# Patient Record
Sex: FEMALE | Race: BLACK OR AFRICAN AMERICAN | Employment: UNEMPLOYED | URBAN - METROPOLITAN AREA
[De-identification: names, ages, dates, MRNs, and addresses within clinical notes are randomized per-mention and may not be internally consistent; named-entity substitution may affect disease eponyms.]

---

## 2018-10-26 ENCOUNTER — OP HISTORICAL/CONVERTED ENCOUNTER (OUTPATIENT)
Dept: OTHER | Age: 28
End: 2018-10-26

## 2019-07-02 ENCOUNTER — ED HISTORICAL/CONVERTED ENCOUNTER (OUTPATIENT)
Dept: OTHER | Age: 29
End: 2019-07-02

## 2020-07-18 ENCOUNTER — ED HISTORICAL/CONVERTED ENCOUNTER (OUTPATIENT)
Dept: OTHER | Age: 30
End: 2020-07-18

## 2020-09-26 ENCOUNTER — HOSPITAL ENCOUNTER (EMERGENCY)
Age: 30
Discharge: HOME | End: 2020-09-26
Attending: INTERNAL MEDICINE
Payer: MEDICAID

## 2020-09-26 ENCOUNTER — APPOINTMENT (OUTPATIENT)
Dept: GENERAL RADIOLOGY | Age: 30
End: 2020-09-26
Attending: INTERNAL MEDICINE
Payer: MEDICAID

## 2020-09-26 VITALS
WEIGHT: 145 LBS | BODY MASS INDEX: 26.68 KG/M2 | DIASTOLIC BLOOD PRESSURE: 73 MMHG | TEMPERATURE: 98.1 F | RESPIRATION RATE: 16 BRPM | OXYGEN SATURATION: 100 % | HEART RATE: 81 BPM | HEIGHT: 62 IN | SYSTOLIC BLOOD PRESSURE: 112 MMHG

## 2020-09-26 DIAGNOSIS — R07.9 CHEST PAIN, UNSPECIFIED TYPE: Primary | ICD-10-CM

## 2020-09-26 LAB
ALBUMIN SERPL-MCNC: 3.8 G/DL (ref 3.5–5)
ALBUMIN/GLOB SERPL: 1 {RATIO} (ref 1.1–2.2)
ALP SERPL-CCNC: 64 U/L (ref 45–117)
ALT SERPL-CCNC: 19 U/L (ref 12–78)
ANION GAP SERPL CALC-SCNC: 8 MMOL/L (ref 5–15)
APPEARANCE UR: CLEAR
AST SERPL W P-5'-P-CCNC: 11 U/L (ref 15–37)
BACTERIA URNS QL MICRO: ABNORMAL /HPF
BILIRUB SERPL-MCNC: 0.2 MG/DL (ref 0.2–1)
BILIRUB UR QL: ABNORMAL
BUN SERPL-MCNC: 12 MG/DL (ref 6–20)
BUN/CREAT SERPL: 15 (ref 12–20)
CA-I BLD-MCNC: 9.2 MG/DL (ref 8.5–10.1)
CHLORIDE SERPL-SCNC: 105 MMOL/L (ref 97–108)
CO2 SERPL-SCNC: 28 MMOL/L (ref 21–32)
COLOR UR: YELLOW
CREAT SERPL-MCNC: 0.79 MG/DL (ref 0.55–1.02)
ERYTHROCYTE [DISTWIDTH] IN BLOOD BY AUTOMATED COUNT: 13 % (ref 11.5–14.5)
GLOBULIN SER CALC-MCNC: 3.7 G/DL (ref 2–4)
GLUCOSE SERPL-MCNC: 115 MG/DL (ref 65–100)
GLUCOSE UR STRIP.AUTO-MCNC: NEGATIVE MG/DL
HCG UR QL: NEGATIVE
HCT VFR BLD AUTO: 37.4 % (ref 35–47)
HGB BLD-MCNC: 11.7 % (ref 11.5–16)
HGB UR QL STRIP: ABNORMAL
KETONES UR QL STRIP.AUTO: 15 MG/DL
LEUKOCYTE ESTERASE UR QL STRIP.AUTO: NEGATIVE
MCH RBC QN AUTO: 26.8 PG (ref 26–34)
MCHC RBC AUTO-ENTMCNC: 31.3 G/DL (ref 30–36.5)
MCV RBC AUTO: 85.6 FL (ref 80–99)
NITRITE UR QL STRIP.AUTO: NEGATIVE
PH UR STRIP: 5 [PH] (ref 5–8)
PLATELET # BLD AUTO: 261 K/UL (ref 150–400)
PMV BLD AUTO: 10.1 FL (ref 8.9–12.9)
POTASSIUM SERPL-SCNC: 3.8 MMOL/L (ref 3.5–5.1)
PROT SERPL-MCNC: 7.5 G/DL (ref 6.4–8.2)
PROT UR STRIP-MCNC: NEGATIVE MG/DL
RBC # BLD AUTO: 4.37 M/UL (ref 3.8–5.2)
RBC #/AREA URNS HPF: ABNORMAL /HPF (ref 0–5)
SODIUM SERPL-SCNC: 141 MMOL/L (ref 136–145)
SP GR UR REFRACTOMETRY: 1.02 (ref 1–1.03)
TROPONIN I SERPL-MCNC: <0.05 NG/ML
UA: UC IF INDICATED,UAUC: ABNORMAL
UROBILINOGEN UR QL STRIP.AUTO: 1 EU/DL (ref 0.2–1)
WBC # BLD AUTO: 8.7 K/UL (ref 3.6–11)
WBC URNS QL MICRO: ABNORMAL /HPF (ref 0–4)

## 2020-09-26 PROCEDURE — 71046 X-RAY EXAM CHEST 2 VIEWS: CPT

## 2020-09-26 PROCEDURE — 84484 ASSAY OF TROPONIN QUANT: CPT

## 2020-09-26 PROCEDURE — 99283 EMERGENCY DEPT VISIT LOW MDM: CPT

## 2020-09-26 PROCEDURE — 80053 COMPREHEN METABOLIC PANEL: CPT

## 2020-09-26 PROCEDURE — 81001 URINALYSIS AUTO W/SCOPE: CPT

## 2020-09-26 PROCEDURE — 85027 COMPLETE CBC AUTOMATED: CPT

## 2020-09-26 PROCEDURE — 93005 ELECTROCARDIOGRAM TRACING: CPT

## 2020-09-26 PROCEDURE — 81025 URINE PREGNANCY TEST: CPT

## 2020-09-26 NOTE — ED PROVIDER NOTES
EMERGENCY DEPARTMENT HISTORY AND PHYSICAL EXAM      Date: 9/26/2020  Patient Name: James Cleaning    History of Presenting Illness     Chief Complaint   Patient presents with    Chest Pain     all week with SOB    Leg Pain     R leg       History Provided By: Patient    HPI: James Cleaning, 26 y.o. female with a past medical history significant History reviewed. No pertinent past medical history. and presents to the ED with cc of squeezing chest pain x 2 days on and off, pain in leg. NO f/c/n/v/d/c    There are no other complaints, changes, or physical findings at this time. PCP: Daylin Blair MD    No current facility-administered medications on file prior to encounter. No current outpatient medications on file prior to encounter. Past History     Past Medical History:  History reviewed. No pertinent past medical history. Past Surgical History:  History reviewed. No pertinent surgical history. Family History:  History reviewed. No pertinent family history. Social History:  Social History     Tobacco Use    Smoking status: Never Smoker    Smokeless tobacco: Never Used   Substance Use Topics    Alcohol use: Yes     Alcohol/week: 4.0 standard drinks     Types: 4 Glasses of wine per week    Drug use: Never       Allergies:  No Known Allergies      Review of Systems   Review of Systems   Constitutional: Negative. HENT: Negative. Respiratory: Negative. Cardiovascular: Negative. Gastrointestinal: Negative. Genitourinary: Negative. Neurological: Negative. Physical Exam   Physical Exam  Vitals signs and nursing note reviewed. Constitutional:       Appearance: She is well-developed. HENT:      Head: Normocephalic and atraumatic. Right Ear: External ear normal.      Left Ear: External ear normal.      Mouth/Throat:      Pharynx: No oropharyngeal exudate. Eyes:      General: No scleral icterus. Right eye: No discharge. Left eye: No discharge. Conjunctiva/sclera: Conjunctivae normal.      Pupils: Pupils are equal, round, and reactive to light. Neck:      Musculoskeletal: Normal range of motion. Thyroid: No thyromegaly. Trachea: No tracheal deviation. Cardiovascular:      Rate and Rhythm: Normal rate and regular rhythm. Heart sounds: Normal heart sounds. No murmur. Pulmonary:      Effort: Pulmonary effort is normal. No respiratory distress. Breath sounds: Normal breath sounds. No wheezing or rales. Chest:      Chest wall: No tenderness. Abdominal:      General: Bowel sounds are normal. There is no distension. Palpations: Abdomen is soft. Tenderness: There is no abdominal tenderness. There is no guarding or rebound. Musculoskeletal: Normal range of motion. General: No tenderness. Lymphadenopathy:      Cervical: No cervical adenopathy. Skin:     General: Skin is warm. Findings: No erythema. Neurological:      Mental Status: She is alert and oriented to person, place, and time. Cranial Nerves: No cranial nerve deficit. Coordination: Coordination normal.   Psychiatric:         Behavior: Behavior normal.         Thought Content:  Thought content normal.         Judgment: Judgment normal.         Diagnostic Study Results     Labs -     Recent Results (from the past 12 hour(s))   URINALYSIS W/ REFLEX CULTURE    Collection Time: 09/26/20  6:00 AM    Specimen: Urine   Result Value Ref Range    Color Yellow      Appearance Clear Clear      Specific gravity 1.025 1.003 - 1.030      pH (UA) 5.0 5.0 - 8.0      Protein Negative Negative mg/dL    Glucose Negative Negative mg/dL    Ketone 15 (A) Negative mg/dL    Bilirubin Small (A) Negative      Blood Trace (A) Negative      Urobilinogen 1.0 0.2 - 1.0 EU/dL    Nitrites Negative Negative      Leukocyte Esterase Negative Negative      WBC 0-4 0 - 4 /hpf    RBC 0-5 0 - 5 /hpf    Bacteria 1+ (A) Negative /hpf    UA:UC IF INDICATED Culture not indicated by UA result Culture not indicated by UA result     HCG URINE, QL    Collection Time: 09/26/20  6:00 AM   Result Value Ref Range    HCG urine, QL Negative Negative     CBC W/O DIFF    Collection Time: 09/26/20  6:30 AM   Result Value Ref Range    WBC 8.7 3.6 - 11.0 K/uL    RBC 4.37 3.80 - 5.20 M/uL    HGB 11.7 11.5 - 16.0 %    HCT 37.4 35.0 - 47.0 %    MCV 85.6 80.0 - 99.0 FL    MCH 26.8 26.0 - 34.0 PG    MCHC 31.3 30.0 - 36.5 g/dL    RDW 13.0 11.5 - 14.5 %    PLATELET 362 754 - 853 K/uL    MPV 10.1 8.9 - 12.9 FL       Radiologic Studies -   XR CHEST PA LAT   Final Result   IMPRESSION: No acute cardiopulmonary abnormality. . Scoliosis           CT Results  (Last 48 hours)    None        CXR Results  (Last 48 hours)               09/26/20 0613  XR CHEST PA LAT Final result    Impression:  IMPRESSION: No acute cardiopulmonary abnormality. . Scoliosis           Narrative:  HISTORY: cp for one week with sob        TECHNIQUE: PA and lateral chest radiographs   COMPARISON: 7/18/2020   LIMITATIONS[de-identified] None       TUBES/LINES: None       LUNG PARENCHYMA: Normal   TRACHEA/BRONCHI: Normal   PULMONARY VESSELS: Normal   AORTIC SHADOW: Unremarkable   PLEURA: Normal   HEART: Normal   MEDIASTINUM: Normal   BONE/SOFT TISSUES: No acute abnormality. Stable appearance of the patient's   known thoracic rotoscoliosis       OTHER: None                 EKG: normal EKG, normal sinus rhythm, unchanged from previous tracings. Medical Decision Making   I am the first provider for this patient. I reviewed the vital signs, available nursing notes, past medical history, past surgical history, family history and social history. Vital Signs-Reviewed the patient's vital signs.   Patient Vitals for the past 12 hrs:   Temp Pulse Resp BP SpO2   09/26/20 0600     100 %   09/26/20 0546 98.1 °F (36.7 °C) 81 16 112/73 100 %       Records Reviewed: Nursing Notes    Provider Notes (Medical Decision Making):   MDM  Number of Diagnoses or Management Options  Chest pain, unspecified type: established, improving     Amount and/or Complexity of Data Reviewed  Clinical lab tests: ordered and reviewed  Tests in the radiology section of CPT®: ordered and reviewed  Discussion of test results with the performing providers: yes    Risk of Complications, Morbidity, and/or Mortality  Presenting problems: moderate  Diagnostic procedures: low  Management options: low    Patient Progress  Patient progress: stable        ED Course:   Initial assessment performed. The patients presenting problems have been discussed, and they are in agreement with the care plan formulated and outlined with them. I have encouraged them to ask questions as they arise throughout their visit. PROCEDURES  Procedures         PLAN:  1. There are no discharge medications for this patient. 2.   Follow-up Information     Follow up With Specialties Details Why Contact Info    Froy Brown MD Internal Medicine In 1 day  [facility]      Tracy Cabezas MD Cardiology, [facility] Vascular Surgery   [address] [place] [address]  [phone]          Return to ED if worse     Diagnosis     Clinical Impression:   1.  Chest pain, unspecified type

## 2020-09-27 LAB
ATRIAL RATE: 82 BPM
CALCULATED P AXIS, ECG09: 39 DEGREES
CALCULATED R AXIS, ECG10: 19 DEGREES
CALCULATED T AXIS, ECG11: 44 DEGREES
DIAGNOSIS, 93000: NORMAL
P-R INTERVAL, ECG05: 120 MS
Q-T INTERVAL, ECG07: 380 MS
QRS DURATION, ECG06: 74 MS
QTC CALCULATION (BEZET), ECG08: 443 MS
VENTRICULAR RATE, ECG03: 82 BPM

## 2020-09-29 ENCOUNTER — OFFICE VISIT (OUTPATIENT)
Dept: INTERNAL MEDICINE CLINIC | Age: 30
End: 2020-09-29
Payer: MEDICAID

## 2020-09-29 VITALS
HEIGHT: 59 IN | RESPIRATION RATE: 16 BRPM | TEMPERATURE: 98.2 F | DIASTOLIC BLOOD PRESSURE: 80 MMHG | BODY MASS INDEX: 28.91 KG/M2 | WEIGHT: 143.4 LBS | OXYGEN SATURATION: 98 % | HEART RATE: 72 BPM | SYSTOLIC BLOOD PRESSURE: 110 MMHG

## 2020-09-29 DIAGNOSIS — E55.9 VITAMIN D DEFICIENCY: ICD-10-CM

## 2020-09-29 DIAGNOSIS — Z91.89 EXCESSIVE CONSUMPTION OF COFFEE: ICD-10-CM

## 2020-09-29 DIAGNOSIS — M79.671 RIGHT FOOT PAIN: ICD-10-CM

## 2020-09-29 DIAGNOSIS — R12 HEARTBURN: ICD-10-CM

## 2020-09-29 DIAGNOSIS — R07.89 ATYPICAL CHEST PAIN: Primary | ICD-10-CM

## 2020-09-29 DIAGNOSIS — M21.42 FLAT FEET, BILATERAL: ICD-10-CM

## 2020-09-29 DIAGNOSIS — M21.41 FLAT FEET, BILATERAL: ICD-10-CM

## 2020-09-29 DIAGNOSIS — M94.0 COSTOCHONDRITIS: ICD-10-CM

## 2020-09-29 DIAGNOSIS — R06.2 WHEEZING: ICD-10-CM

## 2020-09-29 PROCEDURE — 99203 OFFICE O/P NEW LOW 30 MIN: CPT | Performed by: INTERNAL MEDICINE

## 2020-09-29 RX ORDER — ALBUTEROL SULFATE 90 UG/1
1 AEROSOL, METERED RESPIRATORY (INHALATION)
Qty: 1 INHALER | Refills: 0 | Status: SHIPPED | OUTPATIENT
Start: 2020-09-29

## 2020-09-29 RX ORDER — OMEPRAZOLE 40 MG/1
40 CAPSULE, DELAYED RELEASE ORAL DAILY
Qty: 30 CAP | Refills: 1 | Status: SHIPPED | OUTPATIENT
Start: 2020-09-29 | End: 2020-11-30 | Stop reason: SDUPTHER

## 2020-09-29 NOTE — PROGRESS NOTES
Shruthi Linares is a 31 y.o. female and presents with Establish Care (F/U [facility] ER 9/26/2020 CHEST and  SOB , RT leg burning pain but not now, given ibuprofen for discomfort)      She brought emergency room discharge paper she had visited in July, having, right leg pain and burning pain, in the right foot but not now, she visited few days ago to, emergency room at [facility], for the chest pain,  she is referred to, cardiologist and PCP she does not have any primary care physician so far she had cardiogram and lab work done in the hospital few days ago and it is normal,,  while taking history she acknowledged that she is drinking lots of coffee, 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 Currently she has no chest pain she has made appointment with cardiologist, for follow-up after recent ER visit. On 26 September. Currently she has no leg pain. EKG was showing  No exposure to secondhand smoke. Normal sinus rhythm with sinus arrhythmia and possible left atrial enlargement no previous EKG was available to compare, borderline EKG, she was going to study for business administration online,    Review of Systems    Review of Systems   Constitutional: Negative. Eyes: Negative for blurred vision. Cardiovascular:         recently visited ER for chest pain was told that she has costochondritis,,        No past medical history on file. No past surgical history on file.   Social History     Socioeconomic History    Marital status: SINGLE     Spouse name: Not on file    Number of children: Not on file    Years of education: Not on file    Highest education level: Not on file   Tobacco Use    Smoking status: Never Smoker    Smokeless tobacco: Never Used   Substance and Sexual Activity    Alcohol use: Yes     Alcohol/week: 4.0 standard drinks     Types: 4 Glasses of wine per week    Drug use: Never     Family History   Problem Relation Age of Onset    Headache Maternal Grandmother     Diabetes Maternal Grandmother     Heart Disease Maternal Grandfather     Diabetes Paternal Grandmother     Headache Paternal Grandmother        No Known Allergies    Objective:  Visit Vitals  /80 (BP 1 Location: Right arm, BP Patient Position: Sitting)   Pulse 72   Temp 98.2 °F (36.8 °C) (Oral)   Resp 16   Ht 4' 11\" (1.499 m)   Wt 143 lb 6.4 oz (65 kg)   LMP 09/21/2020   SpO2 98%   BMI 28.96 kg/m²       Physical Exam:   Constitutional: General Appearance:Overweight Level of Distress: NAD. Ambulation: ambulating normal  Psychiatric: Mental Status: normal mood and affect and active and alert. Orientation: to time, place, and person. no agitation. ,normal eye contact. Head: Head: normocephalic and atraumatic. Eyes: Pupils: PERRLA. Sclerae: non-icteric. Neck: Neck: supple, trachea midline, and no masses. Lymph Nodes: no cervical LAD. Thyroid: no enlargement or nodules and non-tender. Lungs: Respiratory effort: no dyspnea. Auscultation: no wheezing, rales/crackles, or rhonchi and breath sounds normal and good air movement. Cardiovascular: Apical Impulse: not displaced. Heart Auscultation: normal S1 and S2; no murmurs, rubs, or gallops; and RRR. Neck vessels: no carotid bruits. Pulses including femoral / pedal: normal throughout. Abdomen: Bowel Sounds: normal. Inspection and Palpation: no tenderness, guarding, or masses and soft and non-distended. Liver: non-tender and no hepatomegaly. Spleen: non-tender and no splenomegaly. Musculoskeletal[de-identified] Extremities: no edema,no varicosities.  No Calf tenderness. Neurologic: Gait and Station: normal gait and station. Motor Strength normal right and left. Skin: Inspection and palpation: no rash, lesions, or ulcer. Results for orders placed or performed during the hospital encounter of 09/26/20   URINALYSIS W/ REFLEX CULTURE    Specimen: Urine   Result Value Ref Range    Color Yellow      Appearance Clear Clear      Specific gravity 1.025 1.003 - 1.030      pH (UA) 5.0 5.0 - 8.0      Protein Negative Negative mg/dL    Glucose Negative Negative mg/dL    Ketone 15 (A) Negative mg/dL    Bilirubin Small (A) Negative      Blood Trace (A) Negative      Urobilinogen 1.0 0.2 - 1.0 EU/dL    Nitrites Negative Negative      Leukocyte Esterase Negative Negative      WBC 0-4 0 - 4 /hpf    RBC 0-5 0 - 5 /hpf    Bacteria 1+ (A) Negative /hpf    UA:UC IF INDICATED Culture not indicated by UA result Culture not indicated by UA result     HCG URINE, QL   Result Value Ref Range    HCG urine, QL Negative Negative     CBC W/O DIFF   Result Value Ref Range    WBC 8.7 3.6 - 11.0 K/uL    RBC 4.37 3.80 - 5.20 M/uL    HGB 11.7 11.5 - 16.0 %    HCT 37.4 35.0 - 47.0 %    MCV 85.6 80.0 - 99.0 FL    MCH 26.8 26.0 - 34.0 PG    MCHC 31.3 30.0 - 36.5 g/dL    RDW 13.0 11.5 - 14.5 %    PLATELET 640 251 - 865 K/uL    MPV 10.1 8.9 - 76.3 FL   METABOLIC PANEL, COMPREHENSIVE   Result Value Ref Range    Sodium 141 136 - 145 mmol/L    Potassium 3.8 3.5 - 5.1 mmol/L    Chloride 105 97 - 108 mmol/L    CO2 28 21 - 32 mmol/L    Anion gap 8 5 - 15 mmol/L    Glucose 115 (H) 65 - 100 mg/dL    BUN 12 6 - 20 mg/dL    Creatinine 0.79 0.55 - 1.02 mg/dL    BUN/Creatinine ratio 15 12 - 20      GFR est AA >60 >60 ml/min/1.73m2    GFR est non-AA >60 >60 ml/min/1.73m2    Calcium 9.2 8.5 - 10.1 mg/dL    Bilirubin, total 0.2 0.2 - 1.0 mg/dL    AST (SGOT) 11 (L) 15 - 37 U/L    ALT (SGPT) 19 12 - 78 U/L    Alk.  phosphatase 64 45 - 117 U/L    Protein, total 7.5 6.4 - 8.2 g/dL    Albumin 3.8 3.5 - 5.0 g/dL    Globulin 3.7 2.0 - 4.0 g/dL    A-G Ratio 1.0 (L) 1.1 - 2.2     TROPONIN I   Result Value Ref Range    Troponin-I, Qt. <0.05 <0.05 ng/mL   EKG, 12 LEAD, INITIAL   Result Value Ref Range    Ventricular Rate 82 BPM    Atrial Rate 82 BPM    P-R Interval 120 ms    QRS Duration 74 ms    Q-T Interval 380 ms    QTC Calculation (Bezet) 443 ms    Calculated P Axis 39 degrees    Calculated R Axis 19 degrees    Calculated T Axis 44 degrees    Diagnosis       Normal sinus rhythm with sinus arrhythmia  Possible Left atrial enlargement  Borderline ECG  No previous ECGs available  Confirmed by Crystal Khan ([id number]) on 9/27/2020 6:37:16 AM         Assessment/Plan:    ICD-10-CM ICD-9-CM    1. Atypical chest pain  R07.89 786.59 TSH 3RD GENERATION   2. Costochondritis  M94.0 733.6    3. Heartburn  R12 787.1    4. Flat feet, bilateral  M21.41 734     M21.42     5. Right foot pain  M79.671 729.5    6. Vitamin D deficiency  E55.9 268.9 VITAMIN D, 25 HYDROXY   7. Wheezing  R06.2 786.07    8. Excessive consumption of coffee  Z91.89 V49.89      Orders Placed This Encounter    VITAMIN D, 25 HYDROXY    TSH 3RD GENERATION    omeprazole (PRILOSEC) 40 mg capsule     Sig: Take 1 Cap by mouth daily. Take 30 minutes before eating once a day. Indications: heartburn     Dispense:  30 Cap     Refill:  1    albuterol (PROVENTIL HFA, VENTOLIN HFA, PROAIR HFA) 90 mcg/actuation inhaler     Sig: Take 1 Puff by inhalation every six (6) hours as needed for Wheezing or Shortness of Breath.  Indications: asthma attack     Dispense:  1 Inhaler     Refill:  0       Chest pain visited emergency room on 26 September she was told she has costochondritis and she was given educational handouts, EKG was taken showing sinus rhythm with sinus arrhythmia, and possible left atrial enlargement but no previous EKG available to compare and she was referred to cardiologist for follow-up she was told to take OTC ibuprofen, today she has no chest pain or shortness of breath and on examination no wheezing, different etiologies explained 1 of the etiology may be due to, GERD, or might have allergic reactive airway disease she has family history of heart failure with her grandparents but not with parents, recommended to quit drinking coffee gradually she is drinking in excess, she might have gastritis,, however she will need follow-up with cardiologist to rule out any cardiac etiology, her blood pressure is controlled, she is non-smoker and nonalcoholic no history of substance abuse started on, omeprazole,, and also albuterol inhaler to try she has history of allergies,. Follow-up in 4 weeks. , no history of heavy lifting fall or trauma. She is not taking vitamins and history of vitamin D deficiency vitamin D level ordered. On examination she has flatfeet discussed about putting inserts and stretching exercise to prevent excessive strain on the gastrocnemius muscle,. Explained about healthy eating habits and 1400-calorie diet plan and walking 30 minutes 5 days a week, and small frequent meal, lifestyle and l modification in eating habits to avoid large portion at one time, instead small frequent meals and not to, sleep immediately after eating,. Labs ordered,. Refills given side effects discussed follow-up in 8 weeks. Answered all her questions. ,    lose weight, increase physical activity, follow low fat diet, continue present plan, routine labs ordered, call if any problems    There are no Patient Instructions on file for this visit. Follow-up and Dispositions    · Return in about 2 months (around 11/29/2020) for gerd . pain . nonfasting labs now.

## 2020-10-02 ENCOUNTER — TELEPHONE (OUTPATIENT)
Dept: INTERNAL MEDICINE CLINIC | Age: 30
End: 2020-10-02

## 2020-10-02 LAB
25(OH)D3+25(OH)D2 SERPL-MCNC: 9.7 NG/ML (ref 30–100)
TSH SERPL DL<=0.005 MIU/L-ACNC: 1.6 UIU/ML (ref 0.45–4.5)

## 2020-10-02 RX ORDER — ERGOCALCIFEROL 1.25 MG/1
50000 CAPSULE ORAL
Qty: 12 CAP | Refills: 0 | Status: SHIPPED | OUTPATIENT
Start: 2020-10-02 | End: 2020-11-30 | Stop reason: SDUPTHER

## 2020-10-02 NOTE — PROGRESS NOTES
Please call her that her vitamin D level is very low I am sending prescription for vitamin D once a week for next 3 months,.     Her TSH is normal.

## 2020-11-30 ENCOUNTER — OFFICE VISIT (OUTPATIENT)
Dept: INTERNAL MEDICINE CLINIC | Age: 30
End: 2020-11-30
Payer: MEDICAID

## 2020-11-30 VITALS
DIASTOLIC BLOOD PRESSURE: 72 MMHG | BODY MASS INDEX: 30.4 KG/M2 | RESPIRATION RATE: 16 BRPM | WEIGHT: 150.8 LBS | OXYGEN SATURATION: 99 % | HEART RATE: 80 BPM | HEIGHT: 59 IN | SYSTOLIC BLOOD PRESSURE: 112 MMHG

## 2020-11-30 DIAGNOSIS — E55.9 VITAMIN D DEFICIENCY: ICD-10-CM

## 2020-11-30 DIAGNOSIS — R14.2 BELCHING: ICD-10-CM

## 2020-11-30 DIAGNOSIS — Z23 NEEDS FLU SHOT: ICD-10-CM

## 2020-11-30 DIAGNOSIS — K21.9 GASTROESOPHAGEAL REFLUX DISEASE WITHOUT ESOPHAGITIS: ICD-10-CM

## 2020-11-30 PROCEDURE — 90756 CCIIV4 VACC ABX FREE IM: CPT

## 2020-11-30 PROCEDURE — 99213 OFFICE O/P EST LOW 20 MIN: CPT | Performed by: INTERNAL MEDICINE

## 2020-11-30 RX ORDER — SUCRALFATE 1 G/1
1 TABLET ORAL 4 TIMES DAILY
Qty: 120 TAB | Refills: 2 | Status: SHIPPED | OUTPATIENT
Start: 2020-11-30

## 2020-11-30 RX ORDER — ERGOCALCIFEROL 1.25 MG/1
50000 CAPSULE ORAL
Qty: 12 CAP | Refills: 0 | Status: SHIPPED | OUTPATIENT
Start: 2020-11-30 | End: 2021-06-16 | Stop reason: ALTCHOICE

## 2020-11-30 RX ORDER — OMEPRAZOLE 40 MG/1
40 CAPSULE, DELAYED RELEASE ORAL DAILY
Qty: 30 CAP | Refills: 4 | Status: SHIPPED | OUTPATIENT
Start: 2020-11-30

## 2020-11-30 NOTE — PROGRESS NOTES
Archana Armstrong is a 31 y.o. female and presents with Follow Up Chronic Condition (Heartburn )      She has started taking omeprazole for her symptoms of heartburn. She told me she is feeling better but still sometimes she has belching with burning pain in her throat. According to her she has made remarkable changes in her diet and not eating fried food but only on Thanksgiving she ate fried turkey. She has no depression. Currently she is unemployed. Taking care of her mother. She has no depression no negative thoughts. Her blood pressure is well controlled. No nausea or vomiting and no weight loss. She has done her labs. I reviewed and discussed with her and her vitamin D level is only nine 9.7. She wants to have flu vaccine in the office. .    Review of Systems    Review of Systems   Constitutional: Negative. Eyes: Negative for blurred vision. Respiratory: Negative for cough, shortness of breath and wheezing. Cardiovascular: Negative. Gastrointestinal: Positive for heartburn. Negative for abdominal pain, constipation, nausea and vomiting. On omeprazole and has belching with burning. Genitourinary: Negative. Musculoskeletal: Negative. No past medical history on file. No past surgical history on file. Social History     Socioeconomic History    Marital status: SINGLE     Spouse name: Not on file    Number of children: Not on file    Years of education: Not on file    Highest education level: Not on file   Tobacco Use    Smoking status: Never Smoker    Smokeless tobacco: Never Used   Substance and Sexual Activity    Alcohol use:  Yes     Alcohol/week: 4.0 standard drinks     Types: 4 Glasses of wine per week    Drug use: Never     Family History   Problem Relation Age of Onset    Headache Maternal Grandmother     Diabetes Maternal Grandmother     Heart Disease Maternal Grandfather     Diabetes Paternal Grandmother     Headache Paternal Grandmother      Current Outpatient Medications   Medication Sig Dispense Refill    omeprazole (PRILOSEC) 40 mg capsule Take 1 Cap by mouth daily. Take 30 minutes before eating once a day. Indications: heartburn 30 Cap 4    ergocalciferol (ERGOCALCIFEROL) 1,250 mcg (50,000 unit) capsule Take 1 Cap by mouth every seven (7) days. Indications: low vitamin D levels 12 Cap 0    sucralfate (CARAFATE) 1 gram tablet Take 1 Tab by mouth four (4) times daily. 120 Tab 2    albuterol (PROVENTIL HFA, VENTOLIN HFA, PROAIR HFA) 90 mcg/actuation inhaler Take 1 Puff by inhalation every six (6) hours as needed for Wheezing or Shortness of Breath. Indications: asthma attack 1 Inhaler 0     No Known Allergies    Objective:  Visit Vitals  /72 (BP 1 Location: Left arm, BP Patient Position: Sitting)   Pulse 80   Resp 16   Ht 4' 11\" (1.499 m)   Wt 150 lb 12.8 oz (68.4 kg)   SpO2 99%   BMI 30.46 kg/m²       Physical Exam:   Constitutional: General Appearance: Pleasant personality. Level of Distress: NAD. Psychiatric: Mental Status: normal mood and affect Orientation: to time, place, and person. ,normal eye contact. Head: Head: normocephalic and atraumatic. Eyes: Pupils: PERRLA. Sclerae: non-icteric. Neck: Neck: supple, trachea midline, and no masses. Lymph Nodes: no cervical LAD. Thyroid: no enlargement or nodules and non-tender. Lungs: Respiratory effort: no dyspnea. Auscultation: no wheezing, rales/crackles, or rhonchi and breath sounds normal and good air movement. Cardiovascular: Apical Impulse: not displaced. Heart Auscultation: normal S1 and S2; no murmurs, rubs, or gallops; and RRR. Neck vessels: no carotid bruits. Pulses including femoral / pedal: normal throughout. Abdomen: Bowel Sounds: normal. Inspection and Palpation: no tenderness, guarding, or masses and soft and non-distended. Liver: non-tender and no hepatomegaly. Spleen: non-tender and no splenomegaly. Musculoskeletal[de-identified] Extremities: no edema,no varicosities.  No Calf tenderness. Neurologic: Gait and Station: normal gait and station. Motor Strength normal right and left. Skin: Inspection and palpation: no rash, lesions, or ulcer. Results for orders placed or performed in visit on 09/29/20   VITAMIN D, 25 HYDROXY   Result Value Ref Range    VITAMIN D, 25-HYDROXY 9.7 (L) 30.0 - 100.0 ng/mL   TSH 3RD GENERATION   Result Value Ref Range    TSH 1.600 0.450 - 4.500 uIU/mL       Assessment/Plan:      ICD-10-CM ICD-9-CM    1. Gastroesophageal reflux disease without esophagitis  K21.9 530.81 REFERRAL TO GASTROENTEROLOGY   2. Vitamin D deficiency  E55.9 268.9    3. Belching  R14.2 787.3 REFERRAL TO GASTROENTEROLOGY   4. Needs flu shot  Z23 V04.81 INFLUENZA VACCINE (CCIIV4 VACCINE ANTIBIO FREE 0.5 ML)     Orders Placed This Encounter    Influenza Vaccine, QUAD, Vial (Flucelvax VIAL 59542)    REFERRAL TO GASTROENTEROLOGY     Referral Priority:   Routine     Referral Type:   Consultation     Referral Reason:   Specialty Services Required     Referred to Provider:   Haven Alaniz MD     Number of Visits Requested:   1    omeprazole (PRILOSEC) 40 mg capsule     Sig: Take 1 Cap by mouth daily. Take 30 minutes before eating once a day. Indications: heartburn     Dispense:  30 Cap     Refill:  4    ergocalciferol (ERGOCALCIFEROL) 1,250 mcg (50,000 unit) capsule     Sig: Take 1 Cap by mouth every seven (7) days. Indications: low vitamin D levels     Dispense:  12 Cap     Refill:  0    sucralfate (CARAFATE) 1 gram tablet     Sig: Take 1 Tab by mouth four (4) times daily. Dispense:  120 Tab     Refill:  2     GERD with heartburn and belching, explained about the diet and she has already made changes in her diet and she has partial improvement with omeprazole 40 mg once a day 30 minutes before eating,. Recommended to avoid  skipping the meal.  According to her she does not eat large meals. She does not go to bed immediately after taking dinner also or after taking meals.   Started on sucralfate, also recommended not to talk or gallop in the air while chewing the food and discussed about various triggers that can cause GERD and heartburn. I gave her referral for gastroenterologist if needed. Since her vitamin D level is only 9.7 recommended to take vitamin D rich diet and educational handouts provided and started on vitamin D once a week follow-up in 6 months and I will repeat the labs in next 6 months. Answered all her questions. flu vaccine given in the office as per her request.    lose weight, increase physical activity, follow low fat diet, continue present plan, call if any problems    Patient Instructions     Vaccine Information Statement    Influenza (Flu) Vaccine (Inactivated or Recombinant): What You Need to Know    Many Vaccine Information Statements are available in Portuguese and other languages. See www.immunize.org/vis  Hojas de información sobre vacunas están disponibles en español y en muchos otros idiomas. Visite www.immunize.org/vis    1. Why get vaccinated? Influenza vaccine can prevent influenza (flu). Flu is a contagious disease that spreads around the [facility] every year, usually between October and May. Anyone can get the flu, but it is more dangerous for some people. Infants and young children, people 72years of age and older, pregnant women, and people with certain health conditions or a weakened immune system are at greatest risk of flu complications. Pneumonia, bronchitis, sinus infections and ear infections are examples of flu-related complications. If you have a medical condition, such as heart disease, cancer or diabetes, flu can make it worse. Flu can cause fever and chills, sore throat, muscle aches, fatigue, cough, headache, and runny or stuffy nose. Some people may have vomiting and diarrhea, though this is more common in children than adults.      Each year thousands of people in the [facility] die from flu, and many more are hospitalized. Flu vaccine prevents millions of illnesses and flu-related visits to the doctor each year. 2. Influenza vaccines     CDC recommends everyone 10months of age and older get vaccinated every flu season. Children 6 months through 6years of age may need 2 doses during a single flu season. Everyone else needs only 1 dose each flu season. It takes about 2 weeks for protection to develop after vaccination. There are many flu viruses, and they are always changing. Each year a new flu vaccine is made to protect against three or four viruses that are likely to cause disease in the upcoming flu season. Even when the vaccine doesnt exactly match these viruses, it may still provide some protection. Influenza vaccine does not cause flu. Influenza vaccine may be given at the same time as other vaccines. 3. Talk with your health care provider    Tell your vaccine provider if the person getting the vaccine:   Has had an allergic reaction after a previous dose of influenza vaccine, or has any severe, life-threatening allergies.  Has ever had Guillain-Barré Syndrome (also called GBS). In some cases, your health care provider may decide to postpone influenza vaccination to a future visit. People with minor illnesses, such as a cold, may be vaccinated. People who are moderately or severely ill should usually wait until they recover before getting influenza vaccine. Your health care provider can give you more information. 4. Risks of a reaction     Soreness, redness, and swelling where shot is given, fever, muscle aches, and headache can happen after influenza vaccine.  There may be a very small increased risk of Guillain-Barré Syndrome (GBS) after inactivated influenza vaccine (the flu shot). Chelita Bosseger children who get the flu shot along with pneumococcal vaccine (PCV13), and/or DTaP vaccine at the same time might be slightly more likely to have a seizure caused by fever.  Tell your health care provider if a child who is getting flu vaccine has ever had a seizure. People sometimes faint after medical procedures, including vaccination. Tell your provider if you feel dizzy or have vision changes or ringing in the ears. As with any medicine, there is a very remote chance of a vaccine causing a severe allergic reaction, other serious injury, or death. 5. What if there is a serious problem? An allergic reaction could occur after the vaccinated person leaves the clinic. If you see signs of a severe allergic reaction (hives, swelling of the face and throat, difficulty breathing, a fast heartbeat, dizziness, or weakness), call 9-1-1 and get the person to the nearest hospital.    For other signs that concern you, call your health care provider. Adverse reactions should be reported to the Vaccine Adverse Event Reporting System (VAERS). Your health care provider will usually file this report, or you can do it yourself. Visit the VAERS website at www.vaers. hhs.gov or call 7-685.224.5093. VAERS is only for reporting reactions, and VAERS staff do not give medical advice. 6. The National Vaccine Injury Compensation Program    The [facility] Vaccine Injury Compensation Program (VICP) is a federal program that was created to compensate people who may have been injured by certain vaccines. Visit the VICP website at www.hrsa.gov/vaccinecompensation or call 3-342.944.4340 to learn about the program and about filing a claim. There is a time limit to file a claim for compensation. 7. How can I learn more?  Ask your health care provider.  Call your local or state health department.  Contact the Centers for Disease Control and Prevention (CDC):  - Call 0-800.731.3456 (1-800-CDC-INFO) or  - Visit CDCs influenza website at www.cdc.gov/flu    Vaccine Information Statement (Interim)  Inactivated Influenza Vaccine   8/15/2019  42 MARIA EUGENIA Boyd [id number]-67   Department of Health and Human Services  Centers for Disease Control and Prevention    Office Use Only         Follow-up and Dispositions    · Return in about 6 months (around 5/30/2021) for vit d defn,gerd ,and ref to dr yost.

## 2020-11-30 NOTE — PATIENT INSTRUCTIONS
Vaccine Information Statement Influenza (Flu) Vaccine (Inactivated or Recombinant): What You Need to Know Many Vaccine Information Statements are available in Arabic and other languages. See www.immunize.org/vis Hojas de información sobre vacunas están disponibles en español y en muchos otros idiomas. Visite www.immunize.org/vis 1. Why get vaccinated? Influenza vaccine can prevent influenza (flu). Flu is a contagious disease that spreads around the [facility] every year, usually between October and May. Anyone can get the flu, but it is more dangerous for some people. Infants and young children, people 72years of age and older, pregnant women, and people with certain health conditions or a weakened immune system are at greatest risk of flu complications. Pneumonia, bronchitis, sinus infections and ear infections are examples of flu-related complications. If you have a medical condition, such as heart disease, cancer or diabetes, flu can make it worse. Flu can cause fever and chills, sore throat, muscle aches, fatigue, cough, headache, and runny or stuffy nose. Some people may have vomiting and diarrhea, though this is more common in children than adults. Each year thousands of people in the [facility] die from flu, and many more are hospitalized. Flu vaccine prevents millions of illnesses and flu-related visits to the doctor each year. 2. Influenza vaccines CDC recommends everyone 10months of age and older get vaccinated every flu season. Children 6 months through 6years of age may need 2 doses during a single flu season. Everyone else needs only 1 dose each flu season. It takes about 2 weeks for protection to develop after vaccination. There are many flu viruses, and they are always changing. Each year a new flu vaccine is made to protect against three or four viruses that are likely to cause disease in the upcoming flu season.  Even when the vaccine doesnt exactly match these viruses, it may still provide some protection. Influenza vaccine does not cause flu. Influenza vaccine may be given at the same time as other vaccines. 3. Talk with your health care provider Tell your vaccine provider if the person getting the vaccine: 
 Has had an allergic reaction after a previous dose of influenza vaccine, or has any severe, life-threatening allergies.  Has ever had Guillain-Barré Syndrome (also called GBS). In some cases, your health care provider may decide to postpone influenza vaccination to a future visit. People with minor illnesses, such as a cold, may be vaccinated. People who are moderately or severely ill should usually wait until they recover before getting influenza vaccine. Your health care provider can give you more information. 4. Risks of a reaction  Soreness, redness, and swelling where shot is given, fever, muscle aches, and headache can happen after influenza vaccine.  There may be a very small increased risk of Guillain-Barré Syndrome (GBS) after inactivated influenza vaccine (the flu shot). Onel Polite children who get the flu shot along with pneumococcal vaccine (PCV13), and/or DTaP vaccine at the same time might be slightly more likely to have a seizure caused by fever. Tell your health care provider if a child who is getting flu vaccine has ever had a seizure. People sometimes faint after medical procedures, including vaccination. Tell your provider if you feel dizzy or have vision changes or ringing in the ears. As with any medicine, there is a very remote chance of a vaccine causing a severe allergic reaction, other serious injury, or death. 5. What if there is a serious problem? An allergic reaction could occur after the vaccinated person leaves the clinic.  If you see signs of a severe allergic reaction (hives, swelling of the face and throat, difficulty breathing, a fast heartbeat, dizziness, or weakness), call 9-1-1 and get the person to the nearest hospital. 
 
For other signs that concern you, call your health care provider. Adverse reactions should be reported to the Vaccine Adverse Event Reporting System (VAERS). Your health care provider will usually file this report, or you can do it yourself. Visit the VAERS website at www.vaers. hhs.gov or call 1-394.873.6880. VAERS is only for reporting reactions, and VAERS staff do not give medical advice. 6. The National Vaccine Injury Compensation Program 
 
The [facility] Vaccine Injury Compensation Program (VICP) is a federal program that was created to compensate people who may have been injured by certain vaccines. Visit the VICP website at www.hrsa.gov/vaccinecompensation or call 6-464.415.1503 to learn about the program and about filing a claim. There is a time limit to file a claim for compensation. 7. How can I learn more?  Ask your health care provider.  Call your local or state health department.  Contact the Centers for Disease Control and Prevention (CDC): 
- Call 3-986.655.2619 (1-800-CDC-INFO) or 
- Visit CDCs influenza website at www.cdc.gov/flu Vaccine Information Statement (Interim) Inactivated Influenza Vaccine 8/15/2019 
42 MARIA EUGENIA Pugh 318OU-05 Department of Health and [employer] Centers for Disease Control and Prevention Office Use Only

## 2020-12-30 PROBLEM — Z23 NEEDS FLU SHOT: Status: RESOLVED | Noted: 2020-11-30 | Resolved: 2020-12-30

## 2021-03-13 ENCOUNTER — HOSPITAL ENCOUNTER (EMERGENCY)
Age: 31
Discharge: HOME | End: 2021-03-14
Payer: MEDICAID

## 2021-03-13 VITALS
DIASTOLIC BLOOD PRESSURE: 87 MMHG | BODY MASS INDEX: 29.84 KG/M2 | OXYGEN SATURATION: 100 % | RESPIRATION RATE: 15 BRPM | HEIGHT: 59 IN | HEART RATE: 93 BPM | SYSTOLIC BLOOD PRESSURE: 123 MMHG | WEIGHT: 148 LBS | TEMPERATURE: 98.3 F

## 2021-03-13 DIAGNOSIS — K21.9 GASTROESOPHAGEAL REFLUX DISEASE WITHOUT ESOPHAGITIS: ICD-10-CM

## 2021-03-13 DIAGNOSIS — M25.561 ACUTE PAIN OF RIGHT KNEE: Primary | ICD-10-CM

## 2021-03-13 LAB
D DIMER PPP FEU-MCNC: 0.37 UG/ML(FEU)
TROPONIN I SERPL-MCNC: <0.05 NG/ML

## 2021-03-13 PROCEDURE — 36415 COLL VENOUS BLD VENIPUNCTURE: CPT

## 2021-03-13 PROCEDURE — 99283 EMERGENCY DEPT VISIT LOW MDM: CPT

## 2021-03-13 PROCEDURE — 93005 ELECTROCARDIOGRAM TRACING: CPT

## 2021-03-13 PROCEDURE — 85379 FIBRIN DEGRADATION QUANT: CPT

## 2021-03-13 PROCEDURE — 84484 ASSAY OF TROPONIN QUANT: CPT

## 2021-03-14 ENCOUNTER — TRANSCRIBE ORDER (OUTPATIENT)
Dept: EMERGENCY DEPT | Age: 31
End: 2021-03-14

## 2021-03-14 ENCOUNTER — HOSPITAL ENCOUNTER (OUTPATIENT)
Dept: NON INVASIVE DIAGNOSTICS | Age: 31
Discharge: HOME | End: 2021-03-14
Payer: MEDICAID

## 2021-03-14 DIAGNOSIS — M79.609 LIMB PAIN: ICD-10-CM

## 2021-03-14 DIAGNOSIS — R60.9 SWELLING: ICD-10-CM

## 2021-03-14 DIAGNOSIS — R60.9 SWELLING: Primary | ICD-10-CM

## 2021-03-14 PROCEDURE — 93971 EXTREMITY STUDY: CPT

## 2021-03-14 RX ORDER — IBUPROFEN 800 MG/1
800 TABLET ORAL
Qty: 20 TAB | Refills: 0 | Status: SHIPPED | OUTPATIENT
Start: 2021-03-14 | End: 2021-03-16

## 2021-03-14 NOTE — ED PROVIDER NOTES
EMERGENCY DEPARTMENT HISTORY AND PHYSICAL EXAM      Date: 3/13/2021  Patient Name: Lena Montalvo    History of Presenting Illness     Chief Complaint   Patient presents with    Foot Swelling       History Provided By: Patient    HPI: Lena Montalvo, 31 y.o. female with a past medical history significant No significant past medical history presents to the ED with cc of right leg pain from ankle to above the knee onset 3-4 days ago and worsening. She does note a hx of meniscal injury to the right knee. She went to [facility] today and was sent to the ED for DVT study. Exacerbation of sxs with bending of the right knee. She is also currently c/o burning chest pain, intermittent, and feels like her GERD sxs. Patient specifically denies new medications, hx of DVT, recent surgery, recent travel, SOB, leg injury, falls, and any chance of pregnancy. There are no other complaints, changes, or physical findings at this time. PCP: Miriam Short MD    No current facility-administered medications on file prior to encounter. Current Outpatient Medications on File Prior to Encounter   Medication Sig Dispense Refill    omeprazole (PRILOSEC) 40 mg capsule Take 1 Cap by mouth daily. Take 30 minutes before eating once a day. Indications: heartburn 30 Cap 4    ergocalciferol (ERGOCALCIFEROL) 1,250 mcg (50,000 unit) capsule Take 1 Cap by mouth every seven (7) days. Indications: low vitamin D levels 12 Cap 0    sucralfate (CARAFATE) 1 gram tablet Take 1 Tab by mouth four (4) times daily. 120 Tab 2    albuterol (PROVENTIL HFA, VENTOLIN HFA, PROAIR HFA) 90 mcg/actuation inhaler Take 1 Puff by inhalation every six (6) hours as needed for Wheezing or Shortness of Breath. Indications: asthma attack 1 Inhaler 0       Past History     Past Medical History:  Past Medical History:   Diagnosis Date    Acid reflux        Past Surgical History:  History reviewed. No pertinent surgical history.     Family History:  Family History   Problem Relation Age of Onset    Headache Maternal Grandmother     Diabetes Maternal Grandmother     Heart Disease Maternal Grandfather     Diabetes Paternal Grandmother     Headache Paternal Grandmother        Social History:  Social History     Tobacco Use    Smoking status: Never Smoker    Smokeless tobacco: Never Used   Substance Use Topics    Alcohol use: Not Currently     Comment: occasionally    Drug use: Never       Allergies:  No Known Allergies      Review of Systems   Review of Systems   Constitutional: Negative for activity change, chills and fever. HENT: Negative for congestion, ear pain, rhinorrhea, sneezing and sore throat. Eyes: Negative for pain and visual disturbance. Respiratory: Negative for cough and shortness of breath. Cardiovascular: Positive for leg swelling. Negative for chest pain. Gastrointestinal: Negative for abdominal pain, diarrhea, nausea and vomiting. Genitourinary: Negative for dysuria and hematuria. Musculoskeletal: Positive for arthralgias and myalgias. Negative for gait problem. Skin: Negative for rash. Neurological: Negative for speech difficulty, weakness and headaches. Psychiatric/Behavioral: The patient is not nervous/anxious. All other systems reviewed and are negative. Physical Exam   Physical Exam  Vitals signs and nursing note reviewed. Constitutional:       General: She is not in acute distress. Appearance: Normal appearance. She is not toxic-appearing. HENT:      Head: Normocephalic and atraumatic. Nose: Nose normal.      Mouth/Throat:      Mouth: Mucous membranes are moist.   Eyes:      Extraocular Movements: Extraocular movements intact. Conjunctiva/sclera: Conjunctivae normal.      Pupils: Pupils are equal, round, and reactive to light. Neck:      Musculoskeletal: Normal range of motion. Cardiovascular:      Rate and Rhythm: Normal rate. Pulses: Normal pulses.       Heart sounds: Normal heart sounds. Pulmonary:      Effort: Pulmonary effort is normal. No respiratory distress. Breath sounds: Normal breath sounds. Abdominal:      General: Bowel sounds are normal.      Palpations: Abdomen is soft. Tenderness: There is no abdominal tenderness. Musculoskeletal:         General: No deformity or signs of injury. Right knee: She exhibits decreased range of motion and swelling. She exhibits no effusion, no ecchymosis, no deformity, no erythema, no LCL laxity and no MCL laxity. No tenderness found. Legs:       Comments: +mild tenderness to right calf and popliteal fossa, no erythema, distal pulses intact   Skin:     General: Skin is warm and dry. Capillary Refill: Capillary refill takes less than 2 seconds. Findings: No rash. Neurological:      General: No focal deficit present. Mental Status: She is alert and oriented to person, place, and time. Cranial Nerves: No cranial nerve deficit. Psychiatric:         Mood and Affect: Mood normal.         Diagnostic Study Results     Labs -     Recent Results (from the past 48 hour(s))   D DIMER    Collection Time: 03/13/21 10:52 PM   Result Value Ref Range    D DIMER 0.37 <0.50 ug/ml(FEU)   TROPONIN I    Collection Time: 03/13/21 10:52 PM   Result Value Ref Range    Troponin-I, Qt. <0.05 <0.05 ng/mL       Radiologic Studies -   Results from [place] encounter on 09/26/20   XR CHEST PA LAT    Narrative HISTORY: cp for one week with sob     TECHNIQUE: PA and lateral chest radiographs  COMPARISON: 7/18/2020  LIMITATIONS[de-identified] None    TUBES/LINES: None    LUNG PARENCHYMA: Normal  TRACHEA/BRONCHI: Normal  PULMONARY VESSELS: Normal  AORTIC SHADOW: Unremarkable  PLEURA: Normal  HEART: Normal  MEDIASTINUM: Normal  BONE/SOFT TISSUES: No acute abnormality. Stable appearance of the patient's  known thoracic rotoscoliosis    OTHER: None      Impression IMPRESSION: No acute cardiopulmonary abnormality. . Scoliosis        CT Results  (Last 48 hours)    None          Medical Decision Making   I am the first provider for this patient. I reviewed the vital signs, available nursing notes, past medical history, past surgical history, family history and social history. Vital Signs-Reviewed the patient's vital signs. Patient Vitals for the past 12 hrs:   Temp Pulse Resp BP SpO2   03/13/21 2148 98.3 °F (36.8 °C) 93 15 123/87 100 %       Records Reviewed: Nursing Notes    Provider Notes (Medical Decision Making):     MDM  Number of Diagnoses or Management Options  Acute pain of right knee  Gastroesophageal reflux disease without esophagitis  Diagnosis management comments: DDX: right knee strain, exacerbation of right mensicus injury, DVT, calf strain    Patient will return tomorrow morning for Duplex US of RLE       Amount and/or Complexity of Data Reviewed  Clinical lab tests: ordered and reviewed        ED Course:   Initial assessment performed. The patients presenting problems have been discussed, and they are in agreement with the care plan formulated and outlined with them. I have encouraged them to ask questions as they arise throughout their visit. PROCEDURES    Procedures       Disposition     Disposition: DC- Adult Discharges: All of the diagnostic tests were reviewed and questions answered. Diagnosis, care plan and treatment options were discussed. The patient understands the instructions and will follow up as directed. The patients results have been reviewed with them. They have been counseled regarding their diagnosis. The patient verbally convey understanding and agreement of the signs, symptoms, diagnosis, treatment and prognosis and additionally agrees to follow up as recommended with their PCP in 24 - 48 hours. They also agree with the care-plan and convey that all of their questions have been answered.   I have also put together some discharge instructions for them that include: 1) educational information regarding their diagnosis, 2) how to care for their diagnosis at home, as well a 3) list of reasons why they would want to return to the ED prior to their follow-up appointment, should their condition change. Discharged    DISCHARGE PLAN:  1. Current Discharge Medication List      START taking these medications    Details   ibuprofen (MOTRIN) 800 mg tablet Take 1 Tab by mouth every six (6) hours as needed for Pain for up to 7 days. Qty: 20 Tab, Refills: 0         CONTINUE these medications which have NOT CHANGED    Details   omeprazole (PRILOSEC) 40 mg capsule Take 1 Cap by mouth daily. Take 30 minutes before eating once a day. Indications: heartburn  Qty: 30 Cap, Refills: 4      ergocalciferol (ERGOCALCIFEROL) 1,250 mcg (50,000 unit) capsule Take 1 Cap by mouth every seven (7) days. Indications: low vitamin D levels  Qty: 12 Cap, Refills: 0      sucralfate (CARAFATE) 1 gram tablet Take 1 Tab by mouth four (4) times daily. Qty: 120 Tab, Refills: 2      albuterol (PROVENTIL HFA, VENTOLIN HFA, PROAIR HFA) 90 mcg/actuation inhaler Take 1 Puff by inhalation every six (6) hours as needed for Wheezing or Shortness of Breath. Indications: asthma attack  Qty: 1 Inhaler, Refills: 0           2. Follow-up Information     Follow up With Specialties Details Why Contact Info    Eric Howard MD Internal Medicine Schedule an appointment as soon as possible for a visit  for follow up from ER visit [address] [address]  [phone]      [address] [place] [address] DEPT Emergency Medicine  As needed, If symptoms worsen [address] [facility] [address]  [phone]        3. Return to ED if worse   4. Current Discharge Medication List      START taking these medications    Details   ibuprofen (MOTRIN) 800 mg tablet Take 1 Tab by mouth every six (6) hours as needed for Pain for up to 7 days. Qty: 20 Tab, Refills: 0             Diagnosis     Clinical Impression:   1.  Acute pain of right knee    2.  Gastroesophageal reflux disease without esophagitis

## 2021-03-16 ENCOUNTER — OFFICE VISIT (OUTPATIENT)
Dept: INTERNAL MEDICINE CLINIC | Age: 31
End: 2021-03-16
Payer: MEDICAID

## 2021-03-16 VITALS
OXYGEN SATURATION: 98 % | TEMPERATURE: 98.3 F | HEIGHT: 59 IN | WEIGHT: 149.4 LBS | SYSTOLIC BLOOD PRESSURE: 122 MMHG | BODY MASS INDEX: 30.12 KG/M2 | DIASTOLIC BLOOD PRESSURE: 82 MMHG | RESPIRATION RATE: 12 BRPM | HEART RATE: 95 BPM

## 2021-03-16 DIAGNOSIS — M21.41 FLAT FEET, BILATERAL: ICD-10-CM

## 2021-03-16 DIAGNOSIS — M21.42 FLAT FEET, BILATERAL: ICD-10-CM

## 2021-03-16 DIAGNOSIS — K21.9 GASTROESOPHAGEAL REFLUX DISEASE WITHOUT ESOPHAGITIS: ICD-10-CM

## 2021-03-16 DIAGNOSIS — E55.9 VITAMIN D DEFICIENCY: ICD-10-CM

## 2021-03-16 DIAGNOSIS — M25.561 RIGHT KNEE PAIN, UNSPECIFIED CHRONICITY: ICD-10-CM

## 2021-03-16 PROCEDURE — 99214 OFFICE O/P EST MOD 30 MIN: CPT | Performed by: INTERNAL MEDICINE

## 2021-03-16 RX ORDER — CHOLECALCIFEROL TAB 125 MCG (5000 UNIT) 125 MCG
5000 TAB ORAL DAILY
Qty: 30 TAB | Refills: 2 | Status: SHIPPED | OUTPATIENT
Start: 2021-03-16

## 2021-03-16 RX ORDER — MELOXICAM 15 MG/1
15 TABLET ORAL DAILY
Qty: 30 TAB | Refills: 1 | Status: SHIPPED | OUTPATIENT
Start: 2021-03-16 | End: 2021-06-16

## 2021-03-16 NOTE — PROGRESS NOTES
Chief Complaint   Patient presents with   [facility] ED Follow-up     [facility]        1. Have you been to the ER, urgent care clinic since your last visit? Hospitalized since your last visit? Yes [facility] 03/06/2021 For leg swelling     2. Have you seen or consulted any other health care providers outside of the [address] [place] [address] since your last visit? Include any pap smears or colon screening.  No     Visit Vitals  /82 (BP 1 Location: Left upper arm, BP Patient Position: Sitting, BP Cuff Size: Adult)   Pulse 95   Temp 98.3 °F (36.8 °C) (Oral)   Resp 12   Ht 4' 11\" (1.499 m)   Wt 149 lb 6.4 oz (67.8 kg)   LMP 02/27/2021   SpO2 98%   BMI 30.18 kg/m²

## 2021-03-16 NOTE — PROGRESS NOTES
Emma Espinosa is a 31 y.o. female and presents with ED Follow-up ([facility] )      Ms. Kenny came for  follow-up after recent visit to emergency room. 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  She has no depression. Her blood pressure is controlled.,    Review of Systems    Review of Systems   Constitutional: Negative. HENT: Negative for sinus pain and sore throat. Eyes: Negative for blurred vision. Respiratory: Negative. Cardiovascular: Negative. Gastrointestinal: Negative. Genitourinary: Negative. Musculoskeletal: Positive for joint pain. Rt knee pain. Neurological: Negative for dizziness, tingling, tremors and sensory change. Psychiatric/Behavioral: Negative for depression, hallucinations and memory loss. The patient does not have insomnia. Past Medical History:   Diagnosis Date    Acid reflux      No past surgical history on file.   Social History     Socioeconomic History    Marital status: SINGLE     Spouse name: Not on file    Number of children: Not on file    Years of education: Not on file    Highest education level: Not on file   Tobacco Use    Smoking status: Never Smoker    Smokeless tobacco: Never Used   Substance and Sexual Activity    Alcohol use: Not Currently     Comment: occasionally    Drug use: Never     Family History   Problem Relation Age of Onset    Headache Maternal Grandmother     Diabetes Maternal Grandmother     Heart Disease Maternal Grandfather     Diabetes Paternal Grandmother     Headache Paternal Grandmother      Current Outpatient Medications   Medication Sig Dispense Refill    meloxicam (MOBIC) 15 mg tablet Take 1 Tab by mouth daily. Take for  One week with food and then as needed, 30 Tab 1    cholecalciferol (Vitamin D3) (5000 Units/125 mcg) tab tablet Take 1 Tab by mouth daily. 30 Tab 2    omeprazole (PRILOSEC) 40 mg capsule Take 1 Cap by mouth daily. Take 30 minutes before eating once a day. Indications: heartburn 30 Cap 4    sucralfate (CARAFATE) 1 gram tablet Take 1 Tab by mouth four (4) times daily. 120 Tab 2    albuterol (PROVENTIL HFA, VENTOLIN HFA, PROAIR HFA) 90 mcg/actuation inhaler Take 1 Puff by inhalation every six (6) hours as needed for Wheezing or Shortness of Breath. Indications: asthma attack 1 Inhaler 0    ergocalciferol (ERGOCALCIFEROL) 1,250 mcg (50,000 unit) capsule Take 1 Cap by mouth every seven (7) days. Indications: low vitamin D levels 12 Cap 0     No Known Allergies    Objective:  Visit Vitals  /82 (BP 1 Location: Left upper arm, BP Patient Position: Sitting, BP Cuff Size: Adult)   Pulse 95   Temp 98.3 °F (36.8 °C) (Oral)   Resp 12   Ht 4' 11\" (1.499 m)   Wt 149 lb 6.4 oz (67.8 kg)   LMP 02/27/2021   SpO2 98%   BMI 30.18 kg/m²       Physical Exam:   Constitutional: General Appearance: . Pleasant level of Distress: NAD. Psychiatric: Mental Status: normal mood and affect Orientation: to time, place, and person. ,normal eye contact. Head: Head: normocephalic and atraumatic. Eyes: Pupils: PERRLA. Sclerae: non-icteric. Neck: Neck: supple, trachea midline, and no masses. Lymph Nodes: no cervical LAD. Thyroid: no enlargement or nodules and non-tender. Lungs: Respiratory effort: no dyspnea. Auscultation: no wheezing, rales/crackles, or rhonchi and breath sounds normal and good air movement. Cardiovascular: Apical Impulse: not displaced.  Heart Auscultation: normal S1 and S2; no murmurs, rubs, or gallops; and RRR. Neck vessels: no carotid bruits. Pulses including femoral / pedal: normal throughout. Abdomen: Bowel Sounds: normal. Inspection and Palpation: no tenderness, guarding, or masses and soft and non-distended. Liver: non-tender and no hepatomegaly. Spleen: non-tender and no splenomegaly. Musculoskeletal[de-identified] Extremities: no edema,no varicosities. No Calf tenderness. Neurologic: Gait and Station: normal gait and station. Motor Strength normal right and left. Skin: Inspection and palpation: no rash, lesions, or ulcer. Results for orders placed or performed during the hospital encounter of 03/13/21   D DIMER   Result Value Ref Range    D DIMER 0.37 <0.50 ug/ml(FEU)   TROPONIN I   Result Value Ref Range    Troponin-I, Qt. <0.05 <0.05 ng/mL       Assessment/Plan:      ICD-10-CM ICD-9-CM    1. Right knee pain, unspecified chronicity  M25.561 719.46 REFERRAL TO PHYSICAL THERAPY   2. Gastroesophageal reflux disease without esophagitis  K21.9 530.81    3. Vitamin D deficiency  E55.9 268.9    4. Flat feet, bilateral  M21.41 734 REFERRAL TO PODIATRY    M21.42       Orders Placed This Encounter    REFERRAL TO PODIATRY     Referral Priority:   Routine     Referral Type:   Consultation     Referral Reason:   Specialty Services Required     Referred to Provider:   Carolann Soto DPM     Requested Specialty:   Podiatry     Number of Visits Requested:   1    REFERRAL TO PHYSICAL THERAPY     Referral Priority:   Routine     Referral Type:   PT/OT/ST     Referral Reason:   Specialty Services Required     Requested Specialty:   Physical Therapy     Number of Visits Requested:   1    meloxicam (MOBIC) 15 mg tablet     Sig: Take 1 Tab by mouth daily. Take for  One week with food and then as needed,     Dispense:  30 Tab     Refill:  1    cholecalciferol (Vitamin D3) (5000 Units/125 mcg) tab tablet     Sig: Take 1 Tab by mouth daily.      Dispense:  30 Tab     Refill:  2     Right knee pain posteriorly and popliteal fossa, clinical etiology is unclear to me and idiopathic I did not see any, collateral ligament sprain or meniscus problems,She is having normal range of movement. She was ruled out for blood clot. She visited emergency room on 14th March. She was given ibuprofen. She underwent D-dimer and troponin which was negative. I started her on meloxicam 15 mg once a day for 1 week and then only to be taken as needed and side effects of meloxicam explained on stomach causing gastritis and kidney and blood pressure and fluid retention. She should take it sparingly and she should never underestimate Tylenol she can take Tylenol 500 mg 2-3 times a day. Keep legs propped up position. She has flatfeet referred her to podiatrist.  She has started  Wearing  footwear with inserts. Having vitamin D deficiency she has finished vitamin D once a week for 3 months and I recommended her to take vitamin D 5000 unit once a day for 3 months then I will decrease to 2000 unit/day. And continued on omeprazole having GERD. Also sent her for physical therapy twice a week for 6 weeks and refer her to podiatrist.  She agreed with this plan. Follow-up in 3 months. Refills sent to the pharmacy. Reviewed the notes from emergency room. Reviewed all the imagings and test done during ER visit discussed with her.,  Do not see EKG results in the epic.    lose weight, continue present plan, call if any problems    There are no Patient Instructions on file for this visit. Follow-up and Dispositions    · Return in about 3 months (around 6/16/2021) for knee pain ,gerd ,vit d defn.

## 2021-03-17 LAB
ATRIAL RATE: 86 BPM
CALCULATED P AXIS, ECG09: 54 DEGREES
CALCULATED R AXIS, ECG10: 25 DEGREES
CALCULATED T AXIS, ECG11: 37 DEGREES
DIAGNOSIS, 93000: NORMAL
P-R INTERVAL, ECG05: 122 MS
Q-T INTERVAL, ECG07: 360 MS
QRS DURATION, ECG06: 66 MS
QTC CALCULATION (BEZET), ECG08: 430 MS
VENTRICULAR RATE, ECG03: 86 BPM

## 2021-03-29 ENCOUNTER — OFFICE VISIT (OUTPATIENT)
Dept: PODIATRY | Age: 31
End: 2021-03-29
Payer: MEDICAID

## 2021-03-29 VITALS
TEMPERATURE: 96 F | OXYGEN SATURATION: 100 % | SYSTOLIC BLOOD PRESSURE: 120 MMHG | HEIGHT: 59 IN | WEIGHT: 153.8 LBS | BODY MASS INDEX: 31 KG/M2 | HEART RATE: 87 BPM | DIASTOLIC BLOOD PRESSURE: 72 MMHG

## 2021-03-29 DIAGNOSIS — M54.17 LUMBOSACRAL RADICULOPATHY: Primary | ICD-10-CM

## 2021-03-29 PROCEDURE — 99203 OFFICE O/P NEW LOW 30 MIN: CPT | Performed by: PODIATRIST

## 2021-03-29 NOTE — PROGRESS NOTES
Chief Complaint   Patient presents with    Foot Exam     pt states R leg was swollen and tingling states she went to hospital no blood clots states she was feeling pins and needles and foot was cold     1. Have you been to the ER, urgent care clinic since your last visit? Hospitalized since your last visit? Yes Reason for visit: ER/swollen legs/3-13    2. Have you seen or consulted any other health care providers outside of the [address] [place] [address] since your last visit? Include any pap smears or colon screening.  No  PCP-Dr Ngozi Fernandez

## 2021-03-29 NOTE — PROGRESS NOTES
[facility]    Subjective:         Patient is a 31 y.o. female who is being seen as a new pt for right lower extremity pain. Patient states a few weeks prior the pain started, as a burning/tingling in her right lower extremity. She states she presented to a local urgent care facility and was referred to the emergency department for a DVT scan. She states the scan was performed at [facility] emergency department and was negative. She states she recently follow-up with her PCP who prescribed meloxicam 15 mg daily and physical therapy. She states that the burning/tingling has resolved but she has residual right knee pain. She denies any overt trauma. She denies breaks in skin. She denies any local/systemic signs infection. She denies any other pedal complaints    Past Medical History:   Diagnosis Date    Acid reflux      No past surgical history on file. Family History   Problem Relation Age of Onset    Headache Maternal Grandmother     Diabetes Maternal Grandmother     Heart Disease Maternal Grandfather     Diabetes Paternal Grandmother     Headache Paternal Grandmother       Social History     Tobacco Use    Smoking status: Never Smoker    Smokeless tobacco: Never Used   Substance Use Topics    Alcohol use: Not Currently     Comment: occasionally     No Known Allergies  Prior to Admission medications    Medication Sig Start Date End Date Taking? Authorizing Provider   meloxicam (MOBIC) 15 mg tablet Take 1 Tab by mouth daily. Take for  One week with food and then as needed, 3/16/21  Yes Dee Dee Salgado MD   cholecalciferol (Vitamin D3) (5000 Units/125 mcg) tab tablet Take 1 Tab by mouth daily. 3/16/21  Yes Dee Dee Salgado MD   omeprazole (PRILOSEC) 40 mg capsule Take 1 Cap by mouth daily. Take 30 minutes before eating once a day.   Indications: heartburn 20  Yes Dee Dee Salgado MD   ergocalciferol (ERGOCALCIFEROL) 1,250 mcg (50,000 unit) capsule Take 1 Cap by mouth every seven (7) days. Indications: low vitamin D levels 11/30/20  Yes Maira Cooney MD   sucralfate (CARAFATE) 1 gram tablet Take 1 Tab by mouth four (4) times daily. 11/30/20  Yes Maira Cooney MD   albuterol (PROVENTIL HFA, VENTOLIN HFA, PROAIR HFA) 90 mcg/actuation inhaler Take 1 Puff by inhalation every six (6) hours as needed for Wheezing or Shortness of Breath. Indications: asthma attack 9/29/20  Yes Maira Cooney MD       Review of Systems   Constitutional: Negative. HENT: Negative. Eyes: Negative. Respiratory: Negative. Cardiovascular: Negative. Gastrointestinal: Negative. Endocrine: Negative. Genitourinary: Negative. Musculoskeletal: Positive for arthralgias. Skin: Negative. Allergic/Immunologic: Negative. Neurological: Negative. Hematological: Negative. Psychiatric/Behavioral: Negative. All other systems reviewed and are negative. Objective:     Visit Vitals  /72 (BP 1 Location: Left upper arm, BP Patient Position: Sitting, BP Cuff Size: Small adult)   Pulse 87   Temp (!) 96 °F (35.6 °C) (Temporal)   Ht 4' 11\" (1.499 m)   Wt 153 lb 12.8 oz (69.8 kg)   SpO2 100%   BMI 31.06 kg/m²       Physical Exam  Vitals signs reviewed. Constitutional:       Appearance: She is obese. Cardiovascular:      Pulses:           Dorsalis pedis pulses are 2+ on the right side and 2+ on the left side. Posterior tibial pulses are 2+ on the right side and 2+ on the left side. Pulmonary:      Effort: Pulmonary effort is normal.   Musculoskeletal:      Right knee: Tenderness found. Left knee: Normal.      Right lower leg: No edema. Left lower leg: No edema. Right foot: Normal range of motion. Deformity present. No bunion. Left foot: Normal range of motion. Deformity present. No bunion. Feet:      Right foot:      Protective Sensation: 10 sites tested. 10 sites sensed.       Skin integrity: Skin integrity normal.      Toenail Condition: Right toenails are normal.      Left foot:      Protective Sensation: 10 sites tested. 10 sites sensed. Skin integrity: Skin integrity normal.      Toenail Condition: Left toenails are normal.   Lymphadenopathy:      Lower Body: No right inguinal adenopathy. No left inguinal adenopathy. Skin:     General: Skin is warm. Capillary Refill: Capillary refill takes 2 to 3 seconds. Neurological:      Mental Status: She is alert and oriented to person, place, and time. Psychiatric:         Mood and Affect: Mood and affect normal.         Behavior: Behavior is cooperative. Data Review: No results found for this or any previous visit (from the past 24 hour(s)). Impression:       ICD-10-CM ICD-9-CM    1. Lumbosacral radiculopathy  M54.17 724.4          Recommendation:     Patient seen and evaluated in the office  Discussed and educated patient regarding her current medical condition  Personally reviewed PCP and ED provider consultation notes  Instructed patient to be compliant with the meloxicam 15 mg daily for symptomatic relief.   Also encouraged patient to present for her initial consultation for physical therapy  Symptoms persist, will refer to an orthopedic surgeon for further work-up and management of her right knee pain

## 2021-04-01 ENCOUNTER — HOSPITAL ENCOUNTER (OUTPATIENT)
Dept: PHYSICAL THERAPY | Age: 31
Discharge: HOME | End: 2021-04-01
Payer: MEDICAID

## 2021-04-01 PROCEDURE — 97161 PT EVAL LOW COMPLEX 20 MIN: CPT

## 2021-04-01 PROCEDURE — 97110 THERAPEUTIC EXERCISES: CPT

## 2021-04-01 NOTE — PROGRESS NOTES
[address] [address] [facility] [address] [facility], [address] [place] [address]  Ph: [phone]    Fax: [phone]    Initial Evaluation/Plan of Care/Statement of Necessity for Physical Therapy Services     Patient name: John Gastelum   : 1990  [x]  Patient  Verified Provider#: [id number]    Start of Care: 2021       Onset Date: 3/12/2021  Referral source: Everardo Thomas MD Return visit to MD:      Medical/Treatment Diagnosis: Right knee pain [M25.561]    Payor: [facility] MEDICAID / Plan: [address] [place] [address] 270 / Product Type: Managed Care Medicaid /       Prior Hospitalization: see medical history     Comorbidities: scoliosis  Prior Level of Function: independent  Medications: Verified on Patient Summary List          Patient / Family readiness to learn indicated by: asking questions and trying to perform skills  Persons(s) to be included in education: patient (P)  Barriers to Learning/Limitations: None  Patient Self Reported Health Status: good  Rehabilitation Potential: good    In time:1025am   Out time:1115am Total Treatment Time (min): 50   Total Timed Codes (min): 10 1:1 Treatment Time ([facility] only): 10   Visit #: 1     SUBJECTIVE  Pt states she started having pain and symptoms 3/12/2021 around the anterior knee including pin pricks, numbness, tingling, pain. Pain radiated down into the shin, foot, calf. Pt states she had trouble with walking, standing, etc. Tried ice, heat, rest without full relief of pain. Since then, her knee has been getting better but still having symptoms around the knee. Numbness/tingling has resolved. Describes her pain as more throbbing after standing or walking for longer periods, like 1/2 mild or more or over an hour of standing for painting hobby. Pt has not had imaging done, did have ultrasound for blood clots which was negative. Pt has hx of R knee pain/dysfunction 1 year ago.  Had MRI done and was told that she had a torn ligament. Wore a knee brace and took ibuprofen, used ice/heat. Mechanism of Injury: unknown   Previous Treatment/Compliance: ice/heat, meloxicam prn (not daily)  PMHx/Surgical Hx: none  Work Hx: unemployed  Living Situation: single level home, 3 [address]  Barriers to progress: none  Substance use: none  Cognition: A & O x 4     PAIN:  Area of pain: R knee   Pain Level (0-10 scale): max of 5/10 over the past week   Things that worsen pain: standing/walking prolonged periods, worse in the evenings   Things that ease pain: medications    Pain Level (0-10 scale) pre treatment: 1/10 Pain Level (0-10 scale) post treatment: 2/10    OBJECTIVE     10 min Therapeutic Exercise:  [] See flow sheet :   Rationale: increase ROM, increase strength, improve balance and increase proprioception to improve the patients ability to ambulate and perform hobbies without pain          With   [x] TE   [] TA   [] neuro   [] other: Patient Education: [x] Review HEP    [] Progressed/Changed HEP based on:   [] positioning   [] body mechanics   [] transfers   [] heat/ice application    [] other:      Objective/Functional Measures:   Physical Findings   Ortho:   Posture:  Slight rick LE IR  Gait and Functional Mobility:  Ambulating with minimal antalgic gait, no AD.  Min RLE circumduction/decreased hip/knee flexion  WBS:  full  Palpation: TTP LCL, distal lateral quad, around patella  Specific joints: *normal values in ()  KNEE        AROM          PROM                       MMT   R L R L R L   Extension (0)  8 11   3+ 5   Flexion (145) 104 130   5 4   Patellar Mobility:  Pain limited testing, pt notes occasional instability around knee cap  Additional comments: pain in R knee, end range flexion and with MMT  HIP     AROM       PROM             MMT   R L R L R L   Flexion (120)     4- 5   Extension (15)     4 4   Abduction (40)     4+ 5   Adduction (30)     4-    IR (40)         ER (40)         Additional comments: pain in R knee with MMT, ROM WFL  ANKLE                               AROM                     PROM                     MMT:   R L R L R L   Dorsiflexion (15)    11 15 3+ 5   Plantarflexion (50)     4 5   Inversion (35)      4- 5   Eversion (25)     4 5   Additional comments: R calf tightness  Mobility Assessment: pain limiting testing      Neurological: Reflexes / Sensations: WNL  Special Tests: LCL stress test (+ p! only); MCL stress test (-); Anterior Drawer (+ laxity + p!); Posterior Drawer (+p! Only)  Balance: SLS: R side WNL/30+ sec, L side 16 seconds with increased ankle strategy       ASSESSMENT/Changes in Function:   Pt is a 31 yr old female presenting to outpatient PT services with diagnosis of R knee pain. Impairments include R knee pain, decreased R knee ROM, decrease RLE strength, decrease stability and balance, inflammation and tenderness to palpation with tender restriction through soft tissue/musculature. Mild laxity and pain with special tests. Impairments limit the pt's ability to walk her dog, spend longer times out running errands, and stand for long periods to paint. Pt will benefit from skilled PT services to address impairments and allow return to PLOF. Problem List/Impairments: pain affecting function, decrease ROM, decrease strength, impaired gait/ balance, decrease ADL/ functional abilitiies, decrease activity tolerance and other instability  Patient Goal (s): to be more mobile  Short Term Goals: To be accomplished in 6 treatments:  1. Pt will demo compliance with a progressive HEP. 2. Pt will demo R knee flexion ROM to 120 deg without pain. Long Term Goals: To be accomplished in 12 treatments:  1. Pt will demo 5/5 strength per MMT through RLE  2. Pt will negotiate steps with reciprocal pattern and no railing. 3. Pt will report ability to take her dog for a walk without knee pain. 4. Pt will be able to stand and paint without aggravating R knee pain.   Frequency / Duration: Patient to be seen 2 times per week for 12 treatments. Certification Period: 4/1/2021 - 6/30/21  Treatment Plan may include any combination of the following: Therapeutic exercise, Therapeutic activities, Neuromuscular re-education, Physical agent/modality, Gait/balance training, Manual therapy, Patient education, Self Care training and Stair training    Patient/ Caregiver education and instruction: self care, brace/ splint application and exercises    [x]  Plan of care has been reviewed with PTA. The Plan of Care is based on information from the initial evaluation. Cruz Post, PT, DPT 4/1/2021     ________________________________________________________________________    I certify that the above Therapy Services are being furnished while the patient is under my care. I agree with the treatment plan and certify that this therapy is necessary.     [de-identified] Signature:____________________  Date:____________Time: _________

## 2021-04-05 ENCOUNTER — HOSPITAL ENCOUNTER (OUTPATIENT)
Dept: PHYSICAL THERAPY | Age: 31
Discharge: HOME | End: 2021-04-05
Payer: MEDICAID

## 2021-04-05 PROCEDURE — 97110 THERAPEUTIC EXERCISES: CPT

## 2021-04-05 NOTE — PROGRESS NOTES
PT DAILY TREATMENT NOTE  NON [facility]     Patient Name: Eliazar Maloney  Date:2021  : 1990  [x]  Patient  Verified  Payor: Anastacia Canseco / Plan: 1 [facility] 270 / Product Type: Managed Care Medicaid /    Treatment Area: Right knee pain [M25.561]       Next MD APPT:   In time:07:45 am Out time:08:34 am  Total Treatment Time (min): 49 min  Visit #:  Visit count could not be calculated. Make sure you are using a visit which is associated with an episode. SUBJECTIVE  Pain Level (0-10 scale) pre treatment: 0/10  Pain Level (0-10 scale) post treatment:0/10  Any medication changes, allergies to medications, adverse drug reactions, diagnosis change, or new procedure performed?:   [] No    [] Yes (see summary sheet for update)  Subjective functional status/changes:   [] No changes reported  Patient stated she did not have pain right now, but it comes and goes. She has it with weight bearing, walking and standing. Patient did state tape did help. Patient stated when her knee started hurting she did compensate. OBJECTIVE      45 min Therapeutic Exercise:  [] See flow sheet :   Rationale: increase ROM, increase strength, improve coordination and improve balance to improve the patients ability to take her dog for walks      With   [x] TE   [] TA   [] Neuro   [] SC   [] other: Patient Education: [x] Review HEP    [] Progressed/Changed HEP based on:   [] positioning   [] body mechanics   [] transfers   [] Use of heat/ice     [] other: Info on patella tendon strap,   HO of HEP for 4 way SLR and 4 way ankle        Other Objective/Functional Measures: Taped R Patella tendon with relief noted. Patient performed exercises with tape on. ASSESSMENT/Changes in Function:   Discussed with patient taping her patella tendon due to inflammation from compensating. Patient noted relief with taping. Worked on eccentric lowering with 4 way SLR.  Patient was given HO of HEP and information on patella tendon strap if tape continues to help. Patient did not have any difficulty with exercises or report any increase in pain or discomfort. Will continue to increase exercises as tolerated. Patient will continue to benefit from skilled PT services to modify and progress therapeutic interventions, address functional mobility deficits, address ROM deficits, address strength deficits and analyze and modify body mechanics/ergonomics to attain remaining goals. GOALS/Progress towards goals:  Patient Goal (s): to be more mobile  Short Term Goals: To be accomplished in 6 treatments:  1. Pt will demo compliance with a progressive HEP. [x] Met [] Not met [] Partially met   2. Pt will demo R knee flexion ROM to 120 deg without pain. [] Met [] Not met [] Partially met   Long Term Goals: To be accomplished in 12 treatments:  1. Pt will demo 5/5 strength per MMT through RLE. [] Met [] Not met [] Partially met   2. Pt will negotiate steps with reciprocal pattern and no railing. [] Met [] Not met [] Partially met   3. Pt will report ability to take her dog for a walk without knee pain. [] Met [] Not met [] Partially met   4. Pt will be able to stand and paint without aggravating R knee pain. [] Met [] Not met [] Partially met     Frequency / Duration: Patient to be seen 2 times per week for 12 treatments.   Certification Period: 4/1/2021 - 6/30/21  Treatment Plan may include any combination of the following: Therapeutic exercise, Therapeutic activities, Neuromuscular re-education, Physical agent/modality, Gait/balance training, Manual therapy, Patient education, Self Care training and Stair training  PLAN  [x]  Upgrade activities as tolerated     [x]  Continue plan of care  [x]  Update interventions per flow sheet       []  Discharge due to:_  []  Other:_      Jany Reynolds, PTA 4/5/2021

## 2021-04-07 ENCOUNTER — HOSPITAL ENCOUNTER (OUTPATIENT)
Dept: PHYSICAL THERAPY | Age: 31
Discharge: HOME | End: 2021-04-07
Payer: MEDICAID

## 2021-04-07 PROCEDURE — 97110 THERAPEUTIC EXERCISES: CPT

## 2021-04-07 NOTE — PROGRESS NOTES
PT DAILY TREATMENT NOTE  NON [facility]     Patient Name: Vijay Shankar  Date:2021  : 1990  [x]  Patient  Verified  Payor: Richi Paul / Plan: 1 [facility] 270 / Product Type: Managed Care Medicaid /    Treatment Area: Right knee pain [M25.561]       Next MD APPT:   In time:07:48 am Out time:08:35 am  Total Treatment Time (min): 45 min  Visit #: 3/12 Visit count could not be calculated. Make sure you are using a visit which is associated with an episode. SUBJECTIVE  Pain Level (0-10 scale) pre treatment: 1/10  Pain Level (0-10 scale) post treatment:0/10  Any medication changes, allergies to medications, adverse drug reactions, diagnosis change, or new procedure performed?:   [] No    [] Yes (see summary sheet for update)  Subjective functional status/changes:   [] No changes reported  Patient stated the tape really helped and she took it off last night. She reports she has not had any problems since las treatment session. She did order the strap since the tape was helping. Today post treatment she reports no pain just soreness from the exercises in her quads. She stated the HEP is getting easier now. OBJECTIVE      45 min Therapeutic Exercise:  [] See flow sheet :   Rationale: increase ROM, increase strength, improve coordination and improve balance to improve the patients ability to take her dog for walks      With   [x] TE   [] TA   [] Neuro   [] SC   [] other: Patient Education: [x] Review HEP    [] Progressed/Changed HEP based on:   [] positioning   [] body mechanics   [] transfers   [] Use of heat/ice     [] other: Discussed increasing theraband exercises on next visit. Other Objective/Functional Measures: added standing exercises : Fitter, sidestepping, step ups, sit to stand, Brian Chi,     ASSESSMENT/Changes in Function:   Patient reported relief with patella tendon taping. She also order a patella tendon strap.  She was able to tolerate new exercises today without any difficulty only soreness to qauds and without taping the patella tendon. Did not re-tape patella tendon and will reassess on follow up visit. . Patient reporting no difficulty with HEP and will increase on next visit. Patient will continue to benefit from skilled PT services to modify and progress therapeutic interventions, address functional mobility deficits, address ROM deficits, address strength deficits and analyze and modify body mechanics/ergonomics to attain remaining goals. GOALS/Progress towards goals:  Patient Goal (s): to be more mobile  Short Term Goals: To be accomplished in 6 treatments:  1. Pt will demo compliance with a progressive HEP. [x] Met [] Not met [] Partially met   2. Pt will demo R knee flexion ROM to 120 deg without pain. [] Met [] Not met [] Partially met   Long Term Goals: To be accomplished in 12 treatments:  1. Pt will demo 5/5 strength per MMT through RLE. [] Met [] Not met [] Partially met   2. Pt will negotiate steps with reciprocal pattern and no railing. [] Met [] Not met [x] Partially met Able to perform step ups 4/7/21 with no HHA  3. Pt will report ability to take her dog for a walk without knee pain. [] Met [] Not met [] Partially met   4. Pt will be able to stand and paint without aggravating R knee pain. [] Met [] Not met [] Partially met     Frequency / Duration: Patient to be seen 2 times per week for 12 treatments.   Certification Period: 4/1/2021 - 6/30/21  Treatment Plan may include any combination of the following: Therapeutic exercise, Therapeutic activities, Neuromuscular re-education, Physical agent/modality, Gait/balance training, Manual therapy, Patient education, Self Care training and Stair training  PLAN  [x]  Upgrade activities as tolerated     [x]  Continue plan of care  [x]  Update interventions per flow sheet       []  Discharge due to:_  []  Other:_      Jany Reynolds, PTA 4/7/2021

## 2021-04-12 ENCOUNTER — APPOINTMENT (OUTPATIENT)
Dept: PHYSICAL THERAPY | Age: 31
End: 2021-04-12
Payer: MEDICAID

## 2021-04-14 ENCOUNTER — HOSPITAL ENCOUNTER (OUTPATIENT)
Dept: PHYSICAL THERAPY | Age: 31
Discharge: HOME | End: 2021-04-14
Payer: MEDICAID

## 2021-04-14 PROCEDURE — 97110 THERAPEUTIC EXERCISES: CPT

## 2021-04-14 NOTE — PROGRESS NOTES
PT DAILY TREATMENT NOTE  NON [facility]     Patient Name: Delta Civil  Date:2021  : 1990  [x]  Patient  Verified  Payor: Lesly Orta / Plan: 1 [facility] 270 / Product Type: Managed Care Medicaid /    Treatment Area: Right knee pain [M25.561]       Next MD APPT:   In time:07:48 am Out time:08:30 am  Total Treatment Time (min): 42 min  Visit #: Visit count could not be calculated. Make sure you are using a visit which is associated with an episode. SUBJECTIVE  Pain Level (0-10 scale) pre treatment: 0/10  Pain Level (0-10 scale) post treatment:0/10  Any medication changes, allergies to medications, adverse drug reactions, diagnosis change, or new procedure performed?:   [] No    [] Yes (see summary sheet for update)  Subjective functional status/changes:   [] No changes reported  Patient stated she is feeling good today. She reports the patella tendon strap is really helping and she is going to try to walk her dog without it to see what happens. OBJECTIVE      42 min Therapeutic Exercise:  [] See flow sheet :   Rationale: increase ROM, increase strength, improve coordination and improve balance to improve the patients ability to take her dog for walks      With   [x] TE   [] TA   [] Neuro   [] SC   [] other: Patient Education: [x] Review HEP    [] Progressed/Changed HEP based on:   [] positioning   [] body mechanics   [] transfers   [] Use of heat/ice     [] other:          Other Objective/Functional Measures: added Theraband to 4 way SLR and mini tramp     ASSESSMENT/Changes in Function:   Patient reported relief with patella tendon strap while walking her dog. She is going to try to walk her dog without it to see if pain comes back. She was able to tolerate theraband with 4 way SLR  With no pain only soreness. She has not had any pain recently with functional activity . Discussed increasing exercises as tolerated.   Patient will continue to benefit from skilled PT services to modify and progress therapeutic interventions, address functional mobility deficits, address ROM deficits, address strength deficits and analyze and modify body mechanics/ergonomics to attain remaining goals. GOALS/Progress towards goals:  Patient Goal (s): to be more mobile  Short Term Goals: To be accomplished in 6 treatments:  1. Pt will demo compliance with a progressive HEP. [x] Met [] Not met [] Partially met   2. Pt will demo R knee flexion ROM to 120 deg without pain. [] Met [] Not met [] Partially met   Long Term Goals: To be accomplished in 12 treatments:  1. Pt will demo 5/5 strength per MMT through RLE. [] Met [] Not met [] Partially met   2. Pt will negotiate steps with reciprocal pattern and no railing. [] Met [] Not met [x] Partially met Able to perform step ups 4/7/21 with no HHA  3. Pt will report ability to take her dog for a walk without knee pain. [] Met [] Not met [x] Partially met 4/14 Using patella tendon strap and no pain  4. Pt will be able to stand and paint without aggravating R knee pain. [] Met [] Not met [] Partially met     Frequency / Duration: Patient to be seen 2 times per week for 12 treatments.   Certification Period: 4/1/2021 - 6/30/21  Treatment Plan may include any combination of the following: Therapeutic exercise, Therapeutic activities, Neuromuscular re-education, Physical agent/modality, Gait/balance training, Manual therapy, Patient education, Self Care training and Stair training  PLAN  [x]  Upgrade activities as tolerated     [x]  Continue plan of care  [x]  Update interventions per flow sheet       []  Discharge due to:_  []  Other:_      Jany Reynolds, PTA 4/14/2021

## 2021-04-16 ENCOUNTER — HOSPITAL ENCOUNTER (OUTPATIENT)
Dept: PHYSICAL THERAPY | Age: 31
Discharge: HOME | End: 2021-04-16
Payer: MEDICAID

## 2021-04-16 PROCEDURE — 97110 THERAPEUTIC EXERCISES: CPT

## 2021-04-16 NOTE — PROGRESS NOTES
PT DAILY TREATMENT NOTE  NON [facility]     Patient Name: Dorota Ashby  Date:2021  : 1990  [x]  Patient  Verified  Payor: Blair Colindres / Plan: 1 [facility] 270 / Product Type: Managed Care Medicaid /    Treatment Area: Right knee pain [M25.561]       Next MD APPT:   In time:11:23 am Out time: 12:10 pm  Total Treatment Time (min): 47 min  Visit #: Visit count could not be calculated. Make sure you are using a visit which is associated with an episode. SUBJECTIVE  Pain Level (0-10 scale) pre treatment: 0/10  Pain Level (0-10 scale) post treatment:0/10  Any medication changes, allergies to medications, adverse drug reactions, diagnosis change, or new procedure performed?:   [] No    [] Yes (see summary sheet for update)  Subjective functional status/changes:   [] No changes reported  Reports no pain, stated she had to ice after last session, since she was sore. She did not get a change to walkt the dog and ambulate without the brace but she will try it over the weekend and see how she feels. Reports still using the patella tendon strap and its been helping. OBJECTIVE      47 min Therapeutic Exercise:  [] See flow sheet :   Rationale: increase ROM, increase strength, improve coordination and improve balance to improve the patients ability to take her dog for walks    * decline ice post session. With   [x] TE   [] TA   [] Neuro   [] SC   [] other: Patient Education: [x] Review HEP    [] Progressed/Changed HEP based on:   [] positioning   [] body mechanics   [] transfers   [] Use of heat/ice     [] other:          Other Objective/Functional Measures:   Cont with POC   - Reports diff with single toe raises onL >R LE     ASSESSMENT/Changes in Function:   Patient tolerated exercises well, no reports of knee pain with exercises, stated she will try to walk her dog this weekend without her braced.    . Discussed increasing exercises as tolerated and reports of soreness, encouraged to ice if needed. .  Patient will continue to benefit from skilled PT services to modify and progress therapeutic interventions, address functional mobility deficits, address ROM deficits, address strength deficits and analyze and modify body mechanics/ergonomics to attain remaining goals. GOALS/Progress towards goals:  Patient Goal (s): to be more mobile  Short Term Goals: To be accomplished in 6 treatments:  1. Pt will demo compliance with a progressive HEP. [x] Met [] Not met [] Partially met   2. Pt will demo R knee flexion ROM to 120 deg without pain. [] Met [] Not met [] Partially met   Long Term Goals: To be accomplished in 12 treatments:  1. Pt will demo 5/5 strength per MMT through RLE. [] Met [] Not met [] Partially met   2. Pt will negotiate steps with reciprocal pattern and no railing. [] Met [] Not met [x] Partially met Able to perform step ups 4/7/21 with no HHA  3. Pt will report ability to take her dog for a walk without knee pain. [] Met [] Not met [x] Partially met 4/14 Using patella tendon strap and no pain  4. Pt will be able to stand and paint without aggravating R knee pain. [] Met [] Not met [] Partially met     Frequency / Duration: Patient to be seen 2 times per week for 12 treatments.   Certification Period: 4/1/2021 - 6/30/21  Treatment Plan may include any combination of the following: Therapeutic exercise, Therapeutic activities, Neuromuscular re-education, Physical agent/modality, Gait/balance training, Manual therapy, Patient education, Self Care training and Stair training  PLAN  [x]  Upgrade activities as tolerated     [x]  Continue plan of care  [x]  Update interventions per flow sheet       []  Discharge due to:_  []  Other:_      Sury Figueroa, PT, DPT 4/16/2021

## 2021-04-19 ENCOUNTER — HOSPITAL ENCOUNTER (OUTPATIENT)
Dept: PHYSICAL THERAPY | Age: 31
Discharge: HOME | End: 2021-04-19
Payer: MEDICAID

## 2021-04-19 PROCEDURE — 97110 THERAPEUTIC EXERCISES: CPT

## 2021-04-19 NOTE — PROGRESS NOTES
PT DAILY TREATMENT NOTE  NON [facility]     Patient Name: John Gastelum  Date:2021  : 1990  [x]  Patient  Verified  Payor: Irene Reid / Plan: 1 [facility] 270 / Product Type: Managed Care Medicaid /    Treatment Area: Right knee pain [M25.561]       Next MD APPT:   In time:07:50 am Out time: 08:28 pm  Total Treatment Time (min): 38 min  Visit #: Visit count could not be calculated. Make sure you are using a visit which is associated with an episode. SUBJECTIVE  Pain Level (0-10 scale) pre treatment: 0/10  Pain Level (0-10 scale) post treatment:0/10  Any medication changes, allergies to medications, adverse drug reactions, diagnosis change, or new procedure performed?:   [] No    [] Yes (see summary sheet for update)  Subjective functional status/changes:   [] No changes reported  Patient stated she tried to increase the distance with walking the dog and she could feel the pain in her knee. She states the patella tendon strap offers relief. She has not had any problem since walking her dog. OBJECTIVE      38 min Therapeutic Exercise:  [] See flow sheet :   Rationale: increase ROM, increase strength, improve coordination and improve balance to improve the patients ability to take her dog for walks    . With   [x] TE   [] TA   [] Neuro   [] SC   [] other: Patient Education: [x] Review HEP    [] Progressed/Changed HEP based on:   [] positioning   [] body mechanics   [] transfers   [] Use of heat/ice     [] other:          Other Objective/Functional Measures:   Cont with POC   -added leg press and steps    ASSESSMENT/Changes in Function:   Patient was able to tolerate step ups with slight discomfort with step downs. Patient had increase in knee pain with walking the dog trying to increase distance but able to use patella tendon strap with relief noted.    Patient will continue to benefit from skilled PT services to modify and progress therapeutic interventions, address functional mobility deficits, address ROM deficits, address strength deficits and analyze and modify body mechanics/ergonomics to attain remaining goals. GOALS/Progress towards goals:  Patient Goal (s): to be more mobile  Short Term Goals: To be accomplished in 6 treatments:  1. Pt will demo compliance with a progressive HEP. [x] Met [] Not met [] Partially met   2. Pt will demo R knee flexion ROM to 120 deg without pain. [] Met [] Not met [] Partially met   Long Term Goals: To be accomplished in 12 treatments:  1. Pt will demo 5/5 strength per MMT through RLE. [] Met [] Not met [] Partially met   2. Pt will negotiate steps with reciprocal pattern and no railing. [] Met [] Not met [x] Partially met Able to perform step ups 4/7/21 with no HHA  3. Pt will report ability to take her dog for a walk without knee pain. [] Met [] Not met [x] Partially met 4/14 Using patella tendon strap and no pain  4. Pt will be able to stand and paint without aggravating R knee pain. [] Met [] Not met [] Partially met     Frequency / Duration: Patient to be seen 2 times per week for 12 treatments.   Certification Period: 4/1/2021 - 6/30/21  Treatment Plan may include any combination of the following: Therapeutic exercise, Therapeutic activities, Neuromuscular re-education, Physical agent/modality, Gait/balance training, Manual therapy, Patient education, Self Care training and Stair training  PLAN  [x]  Upgrade activities as tolerated     [x]  Continue plan of care  [x]  Update interventions per flow sheet       []  Discharge due to:_  []  Other:_      Jany Reynolds, PTA 4/19/2021

## 2021-04-21 ENCOUNTER — HOSPITAL ENCOUNTER (OUTPATIENT)
Dept: PHYSICAL THERAPY | Age: 31
Discharge: HOME | End: 2021-04-21
Payer: MEDICAID

## 2021-04-21 PROCEDURE — 97110 THERAPEUTIC EXERCISES: CPT

## 2021-04-21 NOTE — PROGRESS NOTES
PT DAILY TREATMENT NOTE  NON [facility]     Patient Name: Monet Nicole  Date:2021  : 1990  [x]  Patient  Verified  Payor: Claudia Frederick / Plan: 1 [facility] 270 / Product Type: Managed Care Medicaid /    Treatment Area: Right knee pain [M25.561]       Next MD APPT:   In time:07:50 am Out time: 08:30 pm  Total Treatment Time (min): 40 min  Visit #: Visit count could not be calculated. Make sure you are using a visit which is associated with an episode. SUBJECTIVE  Pain Level (0-10 scale) pre treatment: 0/10  Pain Level (0-10 scale) post treatment:0/10  Any medication changes, allergies to medications, adverse drug reactions, diagnosis change, or new procedure performed?:   [] No    [] Yes (see summary sheet for update)  Subjective functional status/changes:   [] No changes reported  Patient stated she felt fine after last session but later on pain increased to patella tendon area again. She had to ice her knee and wear strap. OBJECTIVE      40 min Therapeutic Exercise:  [] See flow sheet :   Rationale: increase ROM, increase strength, improve coordination and improve balance to improve the patients ability to take her dog for walks    . With   [] TE   [] TA   [] Neuro   [] SC   [] other: Patient Education: [] Review HEP    [] Progressed/Changed HEP based on:   [] positioning   [] body mechanics   [] transfers   [] Use of heat/ice     [] other:          Other Objective/Functional Measures:   Cont with POC   Decreased exercises omitted steps and leg press  Checked LLD 1/4 difference in L>R- need to measure on next visit    ASSESSMENT/Changes in Function:   Patient reporting an increase in pain following last session. Patient did have a slight LLD in R LE about 1/4. She reported feeling better with heel lift. Decreased weight bearing exercises today, will work more on strengthening on mat table. Patella tendon strap and ice did decrease pain.  Patient will continue to benefit from skilled PT services to modify and progress therapeutic interventions, address functional mobility deficits, address ROM deficits, address strength deficits and analyze and modify body mechanics/ergonomics to attain remaining goals. GOALS/Progress towards goals:  Patient Goal (s): to be more mobile  Short Term Goals: To be accomplished in 6 treatments:  1. Pt will demo compliance with a progressive HEP. [x] Met [] Not met [] Partially met   2. Pt will demo R knee flexion ROM to 120 deg without pain. [] Met [] Not met [] Partially met   Long Term Goals: To be accomplished in 12 treatments:  1. Pt will demo 5/5 strength per MMT through RLE. [] Met [] Not met [] Partially met   2. Pt will negotiate steps with reciprocal pattern and no railing. [] Met [] Not met [x] Partially met Able to perform step ups 4/7/21 with no HHA  3. Pt will report ability to take her dog for a walk without knee pain. [] Met [] Not met [x] Partially met 4/14 Using patella tendon strap and no pain  4. Pt will be able to stand and paint without aggravating R knee pain. [] Met [] Not met [] Partially met     Frequency / Duration: Patient to be seen 2 times per week for 12 treatments.   Certification Period: 4/1/2021 - 6/30/21  Treatment Plan may include any combination of the following: Therapeutic exercise, Therapeutic activities, Neuromuscular re-education, Physical agent/modality, Gait/balance training, Manual therapy, Patient education, Self Care training and Stair training  PLAN  [x]  Upgrade activities as tolerated     [x]  Continue plan of care  [x]  Update interventions per flow sheet       []  Discharge due to:_  []  Other:_      Jany Reynolds, PTA 4/21/2021

## 2021-04-26 ENCOUNTER — HOSPITAL ENCOUNTER (OUTPATIENT)
Dept: PHYSICAL THERAPY | Age: 31
Discharge: HOME | End: 2021-04-26
Payer: MEDICAID

## 2021-04-26 PROCEDURE — 97110 THERAPEUTIC EXERCISES: CPT

## 2021-04-28 ENCOUNTER — HOSPITAL ENCOUNTER (OUTPATIENT)
Dept: PHYSICAL THERAPY | Age: 31
Discharge: HOME | End: 2021-04-28
Payer: MEDICAID

## 2021-04-28 PROCEDURE — 97110 THERAPEUTIC EXERCISES: CPT

## 2021-04-28 NOTE — PROGRESS NOTES
PT DAILY TREATMENT NOTE  NON [facility]     Patient Name: Izabela Almeida  Date:2021  : 1990  [x]  Patient  Verified  Payor: Cheri Harada / Plan: 1 [facility] 270 / Product Type: Managed Care Medicaid /    Treatment Area: Right knee pain [M25.561]       Next MD APPT:   In time:07:45 am Out time: 08:30 pm  Total Treatment Time (min): 45min  Visit #: Visit count could not be calculated. Make sure you are using a visit which is associated with an episode. SUBJECTIVE  Pain Level (0-10 scale) pre treatment: 0/10  Pain Level (0-10 scale) post treatment:0/10  Any medication changes, allergies to medications, adverse drug reactions, diagnosis change, or new procedure performed?:   [] No    [] Yes (see summary sheet for update)  Subjective functional status/changes:   [] No changes reported  Patient stated she is feeling better did not have any increase in pain following last session. OBJECTIVE      45 min Therapeutic Exercise:  [] See flow sheet :   Rationale: increase ROM, increase strength, improve coordination and improve balance to improve the patients ability to take her dog for walks    . With   [] TE   [] TA   [] Neuro   [] SC   [] other: Patient Education: [] Review HEP    [] Progressed/Changed HEP based on:   [] positioning   [] body mechanics   [] transfers   [] Use of heat/ice     [] other:          Other Objective/Functional Measures: Resumed fitter  Cont with POC       ASSESSMENT/Changes in Function:   Resumed fitter to exercises today with no pain reported. Patient reported less difficulty with 4 way SLR and 4 way ankle with increase in resistance with theraband. Will resume some  weight bearing exercises on follow up visit.  Patient will continue to benefit from skilled PT services to modify and progress therapeutic interventions, address functional mobility deficits, address ROM deficits, address strength deficits and analyze and modify body mechanics/ergonomics to attain remaining goals. GOALS/Progress towards goals:  Patient Goal (s): to be more mobile  Short Term Goals: To be accomplished in 6 treatments:  1. Pt will demo compliance with a progressive HEP. [x] Met [] Not met [] Partially met   2. Pt will demo R knee flexion ROM to 120 deg without pain. [] Met [] Not met [] Partially met   Long Term Goals: To be accomplished in 12 treatments:  1. Pt will demo 5/5 strength per MMT through RLE. [] Met [] Not met [] Partially met   2. Pt will negotiate steps with reciprocal pattern and no railing. [] Met [] Not met [x] Partially met Able to perform step ups 4/7/21 with no HHA  3. Pt will report ability to take her dog for a walk without knee pain. [] Met [] Not met [x] Partially met 4/14 Using patella tendon strap and no pain  4. Pt will be able to stand and paint without aggravating R knee pain. [] Met [] Not met [] Partially met     Frequency / Duration: Patient to be seen 2 times per week for 12 treatments.   Certification Period: 4/1/2021 - 6/30/21  Treatment Plan may include any combination of the following: Therapeutic exercise, Therapeutic activities, Neuromuscular re-education, Physical agent/modality, Gait/balance training, Manual therapy, Patient education, Self Care training and Stair training  PLAN  [x]  Upgrade activities as tolerated     [x]  Continue plan of care  [x]  Update interventions per flow sheet       []  Discharge due to:_  []  Other:_      Jany Reynolds, PTA 4/28/2021

## 2021-05-03 ENCOUNTER — APPOINTMENT (OUTPATIENT)
Dept: PHYSICAL THERAPY | Age: 31
End: 2021-05-03
Payer: MEDICAID

## 2021-05-05 ENCOUNTER — HOSPITAL ENCOUNTER (OUTPATIENT)
Dept: PHYSICAL THERAPY | Age: 31
Discharge: HOME | End: 2021-05-05
Payer: MEDICAID

## 2021-05-05 PROCEDURE — 97110 THERAPEUTIC EXERCISES: CPT

## 2021-05-05 NOTE — PROGRESS NOTES
PT DAILY TREATMENT NOTE  NON [facility]     Patient Name: Jeb Cavazos  Date:2021  : 1990  [x]  Patient  Verified  Payor: Brie Sales / Plan: 1 [facility] 270 / Product Type: Managed Care Medicaid /    Treatment Area: Right knee pain [M25.561]       Next MD APPT:   In time:07:45 am Out time: 08:30 pm  Total Treatment Time (min): 45min  Visit #:10/12 Visit count could not be calculated. Make sure you are using a visit which is associated with an episode. SUBJECTIVE  Pain Level (0-10 scale) pre treatment: 0/10  Pain Level (0-10 scale) post treatment:0/10  Any medication changes, allergies to medications, adverse drug reactions, diagnosis change, or new procedure performed?:   [] No    [] Yes (see summary sheet for update)  Subjective functional status/changes:   [] No changes reported  Patient stated she is feeling better did not have any increase in pain following last session. She reports she has been pain free for 2 weeks. OBJECTIVE      45 min Therapeutic Exercise:  [] See flow sheet :   Rationale: increase ROM, increase strength, improve coordination and improve balance to improve the patients ability to take her dog for walks    . With   [] TE   [] TA   [] Neuro   [] SC   [] other: Patient Education: [] Review HEP    [] Progressed/Changed HEP based on:   [] positioning   [] body mechanics   [] transfers   [] Use of heat/ice     [] other:          Other Objective/Functional Measures: Resumed Wt. Bearing exercises and theraband  Cont with POC       ASSESSMENT/Changes in Function:   Patient was able to tolerate wt.bearing exercises today with no increase in pain or difficulty. Discussed with patient to continue to increase wt. Bearing and walking activities and use ice and patella tendon strap if pain resumes. Patient ambulating currently with no antalgic gait or AD. She has been pain free for 2 weeks per subjective.  Patient will continue to benefit from skilled PT services to modify and progress therapeutic interventions, address functional mobility deficits, address ROM deficits, address strength deficits and analyze and modify body mechanics/ergonomics to attain remaining goals. GOALS/Progress towards goals:  Patient Goal (s): to be more mobile  Short Term Goals: To be accomplished in 6 treatments:  1. Pt will demo compliance with a progressive HEP. [x] Met [] Not met [] Partially met   2. Pt will demo R knee flexion ROM to 120 deg without pain. [] Met [] Not met [] Partially met   Long Term Goals: To be accomplished in 12 treatments:  1. Pt will demo 5/5 strength per MMT through RLE. [] Met [] Not met [] Partially met   2. Pt will negotiate steps with reciprocal pattern and no railing. [] Met [] Not met [x] Partially met Able to perform step ups 4/7/21 with no HHA  3. Pt will report ability to take her dog for a walk without knee pain. [] Met [] Not met [x] Partially met 4/14 Using patella tendon strap and no pain  4. Pt will be able to stand and paint without aggravating R knee pain. [] Met [] Not met [] Partially met     Frequency / Duration: Patient to be seen 2 times per week for 12 treatments.   Certification Period: 4/1/2021 - 6/30/21  Treatment Plan may include any combination of the following: Therapeutic exercise, Therapeutic activities, Neuromuscular re-education, Physical agent/modality, Gait/balance training, Manual therapy, Patient education, Self Care training and Stair training  PLAN  [x]  Upgrade activities as tolerated     [x]  Continue plan of care  [x]  Update interventions per flow sheet       []  Discharge due to:_  []  Other:_      Jany Reynolds, PTA 5/5/2021

## 2021-05-06 ENCOUNTER — APPOINTMENT (OUTPATIENT)
Dept: PHYSICAL THERAPY | Age: 31
End: 2021-05-06
Payer: MEDICAID

## 2021-05-11 ENCOUNTER — HOSPITAL ENCOUNTER (OUTPATIENT)
Dept: PHYSICAL THERAPY | Age: 31
Discharge: HOME | End: 2021-05-11
Payer: MEDICAID

## 2021-05-11 PROCEDURE — 97110 THERAPEUTIC EXERCISES: CPT

## 2021-05-11 NOTE — PROGRESS NOTES
PT DAILY TREATMENT NOTE  NON [facility]     Patient Name: Mena Schmidt  Date:2021  : 1990  [x]  Patient  Verified  Payor: Leola Cornea / Plan: 1 [facility] 270 / Product Type: Managed Care Medicaid /    Treatment Area: Right knee pain [M25.561]       Next MD APPT:   In time:07:00 am Out time:07:42 am  Total Treatment Time (min): 42 min  Visit #: Visit count could not be calculated. Make sure you are using a visit which is associated with an episode. SUBJECTIVE  Pain Level (0-10 scale) pre treatment: 0/10  Pain Level (0-10 scale) post treatment:0/10  Any medication changes, allergies to medications, adverse drug reactions, diagnosis change, or new procedure performed?:   [] No    [] Yes (see summary sheet for update)  Subjective functional status/changes:   [] No changes reported  Patient reports she has had no pain . She has not been able to increase her walking due to her dog injuring his leg and in a cast. Reports only fatigue after session today. OBJECTIVE      42 min Therapeutic Exercise:  [] See flow sheet :   Rationale: increase ROM, increase strength, improve coordination and improve balance to improve the patients ability to take her dog for walks    . With   [] TE   [] TA   [] Neuro   [] SC   [] other: Patient Education: [] Review HEP    [] Progressed/Changed HEP based on:   [] positioning   [] body mechanics   [] transfers   [] Use of heat/ice     [] other:          Other Objective/Functional Measures: Resumed Wt. Bearing exercises and theraband  Reviewed HEP      ASSESSMENT/Changes in Function:   Patient remains pain free. Discussed discharge after next visit. Patient progressing well with goals and functional activity.  Patient will continue to benefit from skilled PT services to modify and progress therapeutic interventions, address functional mobility deficits, address ROM deficits, address strength deficits and analyze and modify body mechanics/ergonomics to attain remaining goals. GOALS/Progress towards goals:  Patient Goal (s): to be more mobile  Short Term Goals: To be accomplished in 6 treatments:  1. Pt will demo compliance with a progressive HEP. [x] Met [] Not met [] Partially met   2. Pt will demo R knee flexion ROM to 120 deg without pain. [] Met [] Not met [] Partially met   Long Term Goals: To be accomplished in 12 treatments:  1. Pt will demo 5/5 strength per MMT through RLE. [] Met [] Not met [] Partially met   2. Pt will negotiate steps with reciprocal pattern and no railing. [] Met [] Not met [x] Partially met Able to perform step ups 4/7/21 with no HHA  3. Pt will report ability to take her dog for a walk without knee pain. [] Met [] Not met [x] Partially met 4/14 Using patella tendon strap and no pain  4. Pt will be able to stand and paint without aggravating R knee pain. [x] Met [] Not met [] Partially met     Frequency / Duration: Patient to be seen 2 times per week for 12 treatments.   Certification Period: 4/1/2021 - 6/30/21  Treatment Plan may include any combination of the following: Therapeutic exercise, Therapeutic activities, Neuromuscular re-education, Physical agent/modality, Gait/balance training, Manual therapy, Patient education, Self Care training and Stair training  PLAN  [x]  Upgrade activities as tolerated     [x]  Continue plan of care  [x]  Update interventions per flow sheet       []  Discharge due to:_  []  Other:_      Jany eRynolds, MISSAEL 5/11/2021

## 2021-05-13 ENCOUNTER — HOSPITAL ENCOUNTER (OUTPATIENT)
Dept: PHYSICAL THERAPY | Age: 31
Discharge: HOME | End: 2021-05-13
Payer: MEDICAID

## 2021-05-13 PROCEDURE — 97110 THERAPEUTIC EXERCISES: CPT

## 2021-05-13 NOTE — ANCILLARY DISCHARGE INSTRUCTIONS
[address] [address] [facility] [address] Ph: [phone] Fax: [phone] Medicaid Discharge Summary 2-15 Patient name: Monet Nicole  : 1990  Provider#: [id number] Referral source: Tam Iglesias MD     
Medical/Treatment Diagnosis: Right knee pain [M25.561] Prior Hospitalization: see medical history Comorbidities: See Plan of Care Prior Level of Function: See Plan of Care Medications: Verified on Patient Summary List 
Start of Care:21   Onset Date:3/12/21 Visits from Start of Care: 12  Missed Visits: 0 Reporting Period :21 to 21 Assessment/Summary of care/GOALS: 
 
 Other Objective/Functional Measures: Reviewed HEP 
SLS: R/L =30 sec 
  
Knee                                 AROM                          PROM                           MMT 
  R L R L R L Extension (0)  0 0     5 5 Flexion [phone]     5 5 Additional comments:  
  
Hip                                AROM                            PROM                              MMT 
  R L R L R L Flexion (120)         5 5 Extension (15)         5 5 Abduction (40)         5 5 Adduction (30)         5 5 IR (40)              
ER (40)              
Additional comments:  
  
ANKLE                               AROM                         PROM                         MMT: 
  R L R L R L Dorsiflexion (15)          5 5 Plantarflexion (50)         5 5 Inversion (35)          5 5 Eversion (25)         5 5 Additional comments:  
  
  
ASSESSMENT/Changes in Function:  
Patient remains pain free for 2-3 weeks and does not use patella tendon strap now. Reports 90% improvement since receiving therapy. Reviewed all of her HEP and walking program as well as proper body mechanics. She has regained full ROM and MMT to R/L LE. She has met all goals. She has a walking program and HEP program she is compliant with.  Plan on discharging to continue on her own. GOALS/Progress towards goals: 
Patient Goal (s): to be more mobile Short Term Goals: To be accomplished in 6 treatments: 1. Pt will demo compliance with a progressive HEP. [x]? Met []? Not met []? Partially met 2. Pt will demo R knee flexion ROM to 120 deg without pain. [x]? Met []? Not met []? Partially met Long Term Goals: To be accomplished in 12 treatments: 1. Pt will demo 5/5 strength per MMT through RLE. [x]? Met []? Not met []? Partially met 2. Pt will negotiate steps with reciprocal pattern and no railing. [x]? Met []? Not met []? Partially met 3. Pt will report ability to take her dog for a walk without knee pain. [x]? Met []? Not met []? Partially met 4. Pt will be able to stand and paint without aggravating R knee pain. [x]? Met []? Not met []? Partially met  
  
 
 
 
RECOMMENDATIONS: 
[x]Discontinue therapy: [x]Patient has reached or is progressing toward set goals and is independent with HEP []Patient is non-compliant or has abdicated 
   []Due to lack of appreciable progress towards set goals []Estefany Almeida, PTA 5/13/2021 Retain this original for your records. If you are unable to process this request in 24 hours, please contact our office.  
________________________________________________________________________ NOTE TO PHYSICIAN:  Please complete the following and FAX to: [address]: 
Fax: [phone]  
____ I have read the above report and request that my patient be discharged from therapy. [address] [place] [address] Signature:_____________________________________________________ Date:_____________Time:_____________    Neena Ugarte MD

## 2021-05-13 NOTE — PROGRESS NOTES
PT DAILY TREATMENT NOTE  NON [facility]     Patient Name: Caleb Prajapati  Date:2021  : 1990  [x]  Patient  Verified  Payor: Adelaide Bañuelos / Plan: 1 [facility] 270 / Product Type: Managed Care Medicaid /    Treatment Area: Right knee pain [M25.561]       Next MD APPT:   In time:07:00 am Out time:07:39 am  Total Treatment Time (min): 39 min  Visit #: Visit count could not be calculated. Make sure you are using a visit which is associated with an episode. SUBJECTIVE  Pain Level (0-10 scale) pre treatment: 0/10  Pain Level (0-10 scale) post treatment:0/10  Any medication changes, allergies to medications, adverse drug reactions, diagnosis change, or new procedure performed?:   [] No    [] Yes (see summary sheet for update)  Subjective functional status/changes:   [] No changes reported  Patient reports 90% improvement since receiving therapy. She indicates she has not had any pain for 2-3 weeks. She is compliant with HEP. The last 3 times she walked her dog no pain and no strap. She has not been able to walk her dog due to him injuring his leg. She can stand without difficulty and go up and down the stairs no rails and reciprocal pattern. OBJECTIVE      39 min Therapeutic Exercise:  [] See flow sheet :   Rationale: increase ROM, increase strength, improve coordination and improve balance to improve the patients ability to take her dog for walks    .      With   [] TE   [] TA   [] Neuro   [] SC   [] other: Patient Education: [x] Review HEP    [] Progressed/Changed HEP based on:   [] positioning   [x] body mechanics   [] transfers   [] Use of heat/ice     [x] other:standing posture        Other Objective/Functional Measures: Reviewed HEP  SLS: R/L =30 sec    Knee          AROM          PROM                       MMT   R L R L R L   Extension (0)  0 0   5 5   Flexion (145) 127 130   5 5   Additional comments:     Hip      AROM       PROM             MMT   R L R L R L Flexion (120)     5 5   Extension (15)     5 5   Abduction (40)     5 5   Adduction (30)     5 5   IR (40)         ER (40)         Additional comments:     ANKLE                               AROM                     PROM                     MMT:   R L R L R L   Dorsiflexion (15)      5 5   Plantarflexion (50)     5 5   Inversion (35)      5 5   Eversion (25)     5 5   Additional comments:       ASSESSMENT/Changes in Function:   Patient remains pain free for 2-3 weeks and does not use patella tendon strap now. Reviewed all of her HEP and walking program as well as proper body mechanics. She has regained full ROM and MMT to R/L LE. She has met all goals. She has a walking program and HEP program she is compliant with. Plan on discharging to continue on her own. GOALS/Progress towards goals:  Patient Goal (s): to be more mobile  Short Term Goals: To be accomplished in 6 treatments:  1. Pt will demo compliance with a progressive HEP. [x] Met [] Not met [] Partially met   2. Pt will demo R knee flexion ROM to 120 deg without pain. [x] Met [] Not met [] Partially met   Long Term Goals: To be accomplished in 12 treatments:  1. Pt will demo 5/5 strength per MMT through RLE. [x] Met [] Not met [] Partially met   2. Pt will negotiate steps with reciprocal pattern and no railing. [x] Met [] Not met [] Partially met   3. Pt will report ability to take her dog for a walk without knee pain. [x] Met [] Not met [] Partially met   4. Pt will be able to stand and paint without aggravating R knee pain. [x] Met [] Not met [] Partially met     Frequency / Duration: Patient to be seen 2 times per week for 12 treatments.   Certification Period: 4/1/2021 - 6/30/21  Treatment Plan may include any combination of the following: Therapeutic exercise, Therapeutic activities, Neuromuscular re-education, Physical agent/modality, Gait/balance training, Manual therapy, Patient education, Self Care training and Stair training  PLAN  []  Upgrade activities as tolerated     []  Continue plan of care  []  Update interventions per flow sheet       [x]  Discharge due to:_meeting goals  []  Other:_      Luz Gilbert, PTA 5/13/2021

## 2021-06-16 ENCOUNTER — OFFICE VISIT (OUTPATIENT)
Dept: INTERNAL MEDICINE CLINIC | Age: 31
End: 2021-06-16
Payer: MEDICAID

## 2021-06-16 VITALS
BODY MASS INDEX: 31.25 KG/M2 | RESPIRATION RATE: 16 BRPM | WEIGHT: 155 LBS | DIASTOLIC BLOOD PRESSURE: 72 MMHG | SYSTOLIC BLOOD PRESSURE: 110 MMHG | HEART RATE: 72 BPM | HEIGHT: 59 IN | OXYGEN SATURATION: 97 %

## 2021-06-16 DIAGNOSIS — Z82.49 FAMILY HISTORY OF HYPERTENSION: ICD-10-CM

## 2021-06-16 DIAGNOSIS — Z83.3 FAMILY HISTORY OF DIABETES MELLITUS: ICD-10-CM

## 2021-06-16 DIAGNOSIS — E55.9 VITAMIN D DEFICIENCY: ICD-10-CM

## 2021-06-16 DIAGNOSIS — J30.2 SEASONAL ALLERGIC RHINITIS, UNSPECIFIED TRIGGER: ICD-10-CM

## 2021-06-16 DIAGNOSIS — K21.9 GASTROESOPHAGEAL REFLUX DISEASE WITHOUT ESOPHAGITIS: ICD-10-CM

## 2021-06-16 PROCEDURE — 99213 OFFICE O/P EST LOW 20 MIN: CPT | Performed by: INTERNAL MEDICINE

## 2021-06-16 RX ORDER — FLUTICASONE PROPIONATE 50 MCG
2 SPRAY, SUSPENSION (ML) NASAL DAILY
Qty: 1 BOTTLE | Refills: 3 | Status: SHIPPED | OUTPATIENT
Start: 2021-06-16

## 2021-06-16 RX ORDER — CETIRIZINE HCL 10 MG
10 TABLET ORAL DAILY
Qty: 30 TABLET | Refills: 3 | Status: SHIPPED | OUTPATIENT
Start: 2021-06-16

## 2021-06-16 NOTE — PROGRESS NOTES
Chief Complaint   Patient presents with    Follow-up    Knee Pain    GERD    Vitamin D Deficiency     Visit Vitals  /75 (BP 1 Location: Left arm, BP Patient Position: Sitting)   Pulse 87   Resp 16   Ht 4' 11\" (1.499 m)   Wt 155 lb (70.3 kg)   LMP 06/04/2021   SpO2 97%   BMI 31.31 kg/m²     1. Have you been to the ER, urgent care clinic since your last visit? Hospitalized since your last visit? No    2. Have you seen or consulted any other health care providers outside of the [address] [place] [address] since your last visit? Include any pap smears or colon screening.  No

## 2021-06-16 NOTE — PROGRESS NOTES
Glenis Rogers is a 31 y.o. female and presents with Follow-up, Knee Pain, GERD, and Vitamin D Deficiency    Ms. Nick Rodriguez came for regular follow-up. She told me that now she has no more knee pain and she is suffering from seasonal allergies,. She takes omeprazole as needed. She had her labs done in October 2020 that she had very low vitamin D level. She has a family history of hypertension diabetes and kidney disease and heart disease. Her blood pressure is well controlled. 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 No depression no mental health problems. Her BMI is 31.31. She is going to make appointment with gynecologist she could not get appointment with female gynecologist.  She is not sexually active no history of any GYN, problems. Review of Systems    Review of Systems   Constitutional: Negative. Eyes: Negative for blurred vision. Respiratory: Negative for cough, shortness of breath, wheezing and stridor. Cardiovascular: Negative. Gastrointestinal:        Heartburn better taking omeprazole   Genitourinary: Negative. Musculoskeletal: Negative. Neurological: Negative for dizziness, tingling and sensory change. Endo/Heme/Allergies: Positive for environmental allergies. Psychiatric/Behavioral: Negative for depression and substance abuse. The patient is not nervous/anxious. Past Medical History:   Diagnosis Date    Acid reflux      History reviewed. No pertinent surgical history.   Social History     Socioeconomic History    Marital status: SINGLE     Spouse name: Not on file    Number of children: Not on file    Years of education: Not on file    Highest education level: Not on file   Tobacco Use    Smoking status: Never Smoker    Smokeless tobacco: Never Used   Vaping Use    Vaping Use: Never used   Substance and Sexual Activity    Alcohol use: Not Currently     Comment: occasionally    Drug use: Never     Social Determinants of Health     Financial Resource Strain:     Difficulty of Paying Living Expenses:    Food Insecurity:     Worried About Running Out of Food in the Last Year:     [address] in the Last Year:    Transportation Needs:     Lack of Transportation (Medical):  Lack of Transportation (Non-Medical):    Physical Activity:     Days of Exercise per Week:     Minutes of Exercise per Session:    Stress:     Feeling of Stress :    Social Connections:     Frequency of Communication with Friends and Family:     Frequency of Social Gatherings with Friends and Family:     Attends [employer] Services:     Active Member of Clubs or Organizations:     Attends Club or Organization Meetings:     Marital Status:      Family History   Problem Relation Age of Onset    Headache Maternal Grandmother     Diabetes Maternal Grandmother     Heart Disease Maternal Grandfather     Diabetes Paternal Grandmother     Headache Paternal Grandmother     Hypertension Mother     Diabetes Father     Hypertension Father      Current Outpatient Medications   Medication Sig Dispense Refill    cetirizine (ZYRTEC) 10 mg tablet Take 1 Tablet by mouth daily. 30 Tablet 3    fluticasone propionate (FLONASE) 50 mcg/actuation nasal spray 2 Sprays by Both Nostrils route daily. 1 Bottle 3    cholecalciferol (Vitamin D3) (5000 Units/125 mcg) tab tablet Take 1 Tab by mouth daily. 30 Tab 2    omeprazole (PRILOSEC) 40 mg capsule Take 1 Cap by mouth daily. Take 30 minutes before eating once a day. Indications: heartburn 30 Cap 4    sucralfate (CARAFATE) 1 gram tablet Take 1 Tab by mouth four (4) times daily. 120 Tab 2    albuterol (PROVENTIL HFA, VENTOLIN HFA, PROAIR HFA) 90 mcg/actuation inhaler Take 1 Puff by inhalation every six (6) hours as needed for Wheezing or Shortness of Breath.  Indications: asthma attack 1 Inhaler 0     No Known Allergies    Objective:  Visit Vitals  /72 (BP 1 Location: Right arm, BP Patient Position: Sitting, BP Cuff Size: Adult)   Pulse 72   Resp 16   Ht 4' 11\" (1.499 m)   Wt 155 lb (70.3 kg)   LMP 06/04/2021   SpO2 97%   BMI 31.31 kg/m²       Physical Exam:   Constitutional: General Appearance: Ezra Thacker Very pleasant level of Distress: NAD. Psychiatric: Mental Status: normal mood and affect Orientation: to time, place, and person. ,normal eye contact. Head: Head: normocephalic and atraumatic. Eyes: Pupils: PERRLA. Sclerae: non-icteric. Neck: Neck: supple, trachea midline, and no masses. Lymph Nodes: no cervical LAD. Thyroid: no enlargement or nodules and non-tender. Lungs: Respiratory effort: no dyspnea. Auscultation: no wheezing, rales/crackles, or rhonchi and breath sounds normal and good air movement. Cardiovascular: Apical Impulse: not displaced. Heart Auscultation: normal S1 and S2; no murmurs, rubs, or gallops; and RRR. Neck vessels: no carotid bruits. Pulses including femoral / pedal: normal throughout. Abdomen: Bowel Sounds: normal. Inspection and Palpation: no tenderness, guarding, or masses and soft and non-distended. Liver: non-tender and no hepatomegaly. Spleen: non-tender and no splenomegaly. Musculoskeletal[de-identified] Extremities: no edema,no varicosities. No Calf tenderness. Neurologic: Gait and Station: normal gait and station. Motor Strength normal right and left. Skin: Inspection and palpation: no rash, lesions, or ulcer.        Results for orders placed or performed during the hospital encounter of 03/13/21   D DIMER   Result Value Ref Range    D DIMER 0.37 <0.50 ug/ml(FEU)   TROPONIN I   Result Value Ref Range    Troponin-I, Qt. <0.05 <0.05 ng/mL   EKG, 12 LEAD, INITIAL   Result Value Ref Range    Ventricular Rate 86 BPM    Atrial Rate 86 BPM    P-R Interval 122 ms    QRS Duration 66 ms    Q-T Interval 360 ms    QTC Calculation (Bezet) 430 ms    Calculated P Axis 54 degrees    Calculated R Axis 25 degrees    Calculated T Axis 37 degrees    Diagnosis       Normal sinus rhythm with sinus arrhythmia  Possible Left atrial enlargement  Borderline ECG  When compared with ECG of 26-SEP-2020 05:56,  No significant change was found  Confirmed by LUCRETIA Benavides MD ([id number]) on 3/17/2021 10:34:27 AM         Assessment/Plan:      ICD-10-CM ICD-9-CM    1. Vitamin D deficiency  E55.9 268.9 VITAMIN D, 25 HYDROXY   2. Gastroesophageal reflux disease without esophagitis  K21.9 530.81    3. Seasonal allergic rhinitis, unspecified trigger  J30.2 477.9    4. Family history of diabetes mellitus  Z83.3 V18.0    5. Family history of hypertension  Z82.49 V17.49      Orders Placed This Encounter    VITAMIN D, 25 HYDROXY    cetirizine (ZYRTEC) 10 mg tablet     Sig: Take 1 Tablet by mouth daily. Dispense:  30 Tablet     Refill:  3    fluticasone propionate (FLONASE) 50 mcg/actuation nasal spray     Si Sprays by Both Nostrils route daily. Dispense:  1 Bottle     Refill:  3         Vitamin D deficiency she had taken vitamin D once a week and taking maintenance dose. Her vitamin D level was 9.7 in 2020. Labs ordered. GERD continue  small frequent meals with diet low in fat or oil, cheese butter, and carbohydrate portion control, she takes omeprazole as needed. She has no more knee pain. Seasonal  allergies bothering her started on cetirizine and fluticasone nasal spray and she agreed. Vitamin D level ordered and follow-up in 5 months for annual preventive physical.  I gave phone number and address for other gynecologist because she is not getting appointment with female gynecologist.  She has no depression. She has  family history of hypertension diabetes high cholesterol, heart disease and kidney disease education given, for DASH diet, diet low in sodium, diet low in fat, diet low in carbohydrate to prevent diabetes hypertension high cholesterol to prevent heart disease stroke kidney disease and other problems. She is up to date with the 2 doses of COVID-19 vaccine.     lose weight, increase physical activity, continue present plan, routine labs ordered, call if any problems. There are no Patient Instructions on file for this visit. Follow-up and Dispositions    · Return in about 5 months (around 11/16/2021) for physical follow up,lab.

## 2021-12-03 ENCOUNTER — HOSPITAL ENCOUNTER (EMERGENCY)
Age: 31
Discharge: HOME | End: 2021-12-03
Attending: EMERGENCY MEDICINE
Payer: MEDICAID

## 2021-12-03 VITALS
OXYGEN SATURATION: 100 % | HEIGHT: 60 IN | RESPIRATION RATE: 16 BRPM | HEART RATE: 89 BPM | BODY MASS INDEX: 28.47 KG/M2 | WEIGHT: 145 LBS | DIASTOLIC BLOOD PRESSURE: 78 MMHG | SYSTOLIC BLOOD PRESSURE: 120 MMHG | TEMPERATURE: 97.8 F

## 2021-12-03 DIAGNOSIS — T14.8XXA MUSCLE STRAIN: ICD-10-CM

## 2021-12-03 DIAGNOSIS — R10.9 LEFT FLANK PAIN: Primary | ICD-10-CM

## 2021-12-03 LAB
APPEARANCE UR: CLEAR
BACTERIA URNS QL MICRO: ABNORMAL /HPF
BILIRUB UR QL: NEGATIVE
COLOR UR: YELLOW
EPITH CASTS URNS QL MICRO: ABNORMAL /LPF
GLUCOSE UR STRIP.AUTO-MCNC: NEGATIVE MG/DL
HCG UR QL: NEGATIVE
HGB UR QL STRIP: NEGATIVE
KETONES UR QL STRIP.AUTO: 15 MG/DL
LEUKOCYTE ESTERASE UR QL STRIP.AUTO: NEGATIVE
MUCOUS THREADS URNS QL MICRO: ABNORMAL /LPF
NITRITE UR QL STRIP.AUTO: NEGATIVE
PH UR STRIP: 6.5 [PH] (ref 5–8)
PROT UR STRIP-MCNC: NEGATIVE MG/DL
RBC #/AREA URNS HPF: ABNORMAL /HPF (ref 0–5)
SP GR UR REFRACTOMETRY: 1.01 (ref 1–1.03)
UA: UC IF INDICATED,UAUC: ABNORMAL
UROBILINOGEN UR QL STRIP.AUTO: 0.1 EU/DL (ref 0.2–1)
WBC URNS QL MICRO: ABNORMAL /HPF (ref 0–4)

## 2021-12-03 PROCEDURE — 81025 URINE PREGNANCY TEST: CPT

## 2021-12-03 PROCEDURE — 74011250637 HC RX REV CODE- 250/637: Performed by: EMERGENCY MEDICINE

## 2021-12-03 PROCEDURE — 96372 THER/PROPH/DIAG INJ SC/IM: CPT

## 2021-12-03 PROCEDURE — 99283 EMERGENCY DEPT VISIT LOW MDM: CPT

## 2021-12-03 PROCEDURE — 74011000250 HC RX REV CODE- 250: Performed by: EMERGENCY MEDICINE

## 2021-12-03 PROCEDURE — 81001 URINALYSIS AUTO W/SCOPE: CPT

## 2021-12-03 PROCEDURE — 74011250636 HC RX REV CODE- 250/636: Performed by: EMERGENCY MEDICINE

## 2021-12-03 RX ORDER — LIDOCAINE 4 G/100G
1 PATCH TOPICAL EVERY 24 HOURS
Status: DISCONTINUED | OUTPATIENT
Start: 2021-12-03 | End: 2021-12-04 | Stop reason: HOSPADM

## 2021-12-03 RX ORDER — ACETAMINOPHEN 500 MG
1000 TABLET ORAL ONCE
Status: COMPLETED | OUTPATIENT
Start: 2021-12-03 | End: 2021-12-03

## 2021-12-03 RX ORDER — LIDOCAINE 4 G/100G
PATCH TOPICAL
Qty: 15 PATCH | Refills: 2 | Status: SHIPPED | OUTPATIENT
Start: 2021-12-03 | End: 2021-12-16 | Stop reason: ALTCHOICE

## 2021-12-03 RX ORDER — IBUPROFEN 800 MG/1
800 TABLET ORAL
Qty: 20 TABLET | Refills: 0 | Status: SHIPPED | OUTPATIENT
Start: 2021-12-03 | End: 2021-12-10

## 2021-12-03 RX ORDER — KETOROLAC TROMETHAMINE 30 MG/ML
60 INJECTION, SOLUTION INTRAMUSCULAR; INTRAVENOUS
Status: COMPLETED | OUTPATIENT
Start: 2021-12-03 | End: 2021-12-03

## 2021-12-03 RX ADMIN — ACETAMINOPHEN 1000 MG: 500 TABLET ORAL at 21:22

## 2021-12-03 RX ADMIN — KETOROLAC TROMETHAMINE 60 MG: 30 INJECTION, SOLUTION INTRAMUSCULAR; INTRAVENOUS at 21:22

## 2021-12-04 NOTE — ED TRIAGE NOTES
Pt recently diagnosed with GEERD taking omeprazole and carafate, began with left flank pain 2 weeks ago that has become increasingly worse. Contacted MD and has appointment on Dec 16th, but did not want to wait. Pt describes pain a intermittent between stabbing and dull and today began to radiate to her left back.

## 2021-12-04 NOTE — ED PROVIDER NOTES
EMERGENCY DEPARTMENT HISTORY AND PHYSICAL EXAM      Date: 12/3/2021  Patient Name: Fadumo Lamar    History of Presenting Illness     Chief Complaint   Patient presents with    Flank Pain     ongoing for a few weeks no appt with md until dec 16       History Provided By: Patient    HPI: Fadumo Lamar, 27 y.o. female   presents to the ED with cc of neck pain. Patient complains of intermittent episode of left flank pain for last 2 weeks. Patient states the pain is sharp and stabbing nature of the last few seconds to several hours at a time without obvious precipitating factors. Patient states that moving the torso in certain way aggravates the pain. No radiation of pain. No dysuria hematuria. No loss of appetite. Patient denies EtOH abuse. No abdominal pain. Normal appetite. No URI symptoms. No cough. No fever chills. No history of injury. PCP: Marleni Lund MD    No current facility-administered medications on file prior to encounter. Current Outpatient Medications on File Prior to Encounter   Medication Sig Dispense Refill    cetirizine (ZYRTEC) 10 mg tablet Take 1 Tablet by mouth daily. 30 Tablet 3    fluticasone propionate (FLONASE) 50 mcg/actuation nasal spray 2 Sprays by Both Nostrils route daily. 1 Bottle 3    cholecalciferol (Vitamin D3) (5000 Units/125 mcg) tab tablet Take 1 Tab by mouth daily. 30 Tab 2    omeprazole (PRILOSEC) 40 mg capsule Take 1 Cap by mouth daily. Take 30 minutes before eating once a day. Indications: heartburn 30 Cap 4    sucralfate (CARAFATE) 1 gram tablet Take 1 Tab by mouth four (4) times daily. 120 Tab 2    albuterol (PROVENTIL HFA, VENTOLIN HFA, PROAIR HFA) 90 mcg/actuation inhaler Take 1 Puff by inhalation every six (6) hours as needed for Wheezing or Shortness of Breath.  Indications: asthma attack 1 Inhaler 0       Past History     Past Medical History:  Past Medical History:   Diagnosis Date    Acid reflux        Past Surgical History:  No past surgical history on file. Family History:  Family History   Problem Relation Age of Onset    Headache Maternal Grandmother     Diabetes Maternal Grandmother     Heart Disease Maternal Grandfather     Diabetes Paternal Grandmother     Headache Paternal [de-identified]     Hypertension Mother    [facility] Diabetes Father     Hypertension Father        Social History:  Social History     Tobacco Use    Smoking status: Never Smoker    Smokeless tobacco: Never Used   Vaping Use    Vaping Use: Never used   Substance Use Topics    Alcohol use: Yes     Comment: occasionally    Drug use: Never       Allergies:  No Known Allergies      Review of Systems   Review of Systems   Constitutional: Negative for activity change, appetite change, chills and fever. HENT: Negative for sore throat. Eyes: Negative for discharge. Respiratory: Negative for shortness of breath. Cardiovascular: Negative for chest pain. Gastrointestinal: Negative for nausea. Endocrine: Negative for polyuria. Genitourinary: Negative for difficulty urinating. Musculoskeletal: Negative for arthralgias. Skin: Negative for rash. Neurological: Negative for headaches. Hematological: Negative for adenopathy. Psychiatric/Behavioral: Negative for suicidal ideas. All other systems reviewed and are negative. Physical Exam   Physical Exam  Vitals and nursing note reviewed. Constitutional:       Appearance: Normal appearance. HENT:      Head: Normocephalic and atraumatic. Nose: Nose normal.      Mouth/Throat:      Mouth: Mucous membranes are moist.      Pharynx: Oropharynx is clear. Eyes:      Conjunctiva/sclera: Conjunctivae normal.   Cardiovascular:      Rate and Rhythm: Normal rate and regular rhythm. Heart sounds: Normal heart sounds. Pulmonary:      Effort: Pulmonary effort is normal.      Breath sounds: Normal breath sounds. Abdominal:      General: Abdomen is flat.  Bowel sounds are normal.      Palpations: Abdomen is soft. Tenderness: There is no abdominal tenderness. There is no right CVA tenderness, left CVA tenderness, guarding or rebound. Comments: Left flank pain aggravated with flexion of torso. Skin normal without signs of shingles. Musculoskeletal:      Cervical back: Neck supple. Right lower leg: No edema. Left lower leg: No edema. Skin:     General: Skin is warm and dry. Neurological:      General: No focal deficit present. Mental Status: She is alert and oriented to person, place, and time. Psychiatric:         Mood and Affect: Mood normal.         Diagnostic Study Results     Labs -     Recent Results (from the past 12 hour(s))   HCG URINE, QL    Collection Time: 12/03/21  8:45 PM   Result Value Ref Range    HCG urine, QL Negative Negative     URINALYSIS W/ REFLEX CULTURE    Collection Time: 12/03/21  8:45 PM    Specimen: Urine   Result Value Ref Range    Color Yellow      Appearance Clear Clear      Specific gravity 1.015 1.003 - 1.030      pH (UA) 6.5 5.0 - 8.0      Protein Negative Negative mg/dL    Glucose Negative Negative mg/dL    Ketone 15 (A) Negative mg/dL    Bilirubin Negative Negative      Blood Negative Negative      Urobilinogen 0.1 (L) 0.2 - 1.0 EU/dL    Nitrites Negative Negative      Leukocyte Esterase Negative Negative      WBC 0-4 0 - 4 /hpf    RBC 0-5 0 - 5 /hpf    Epithelial cells Few Few /lpf    Bacteria 2+ (A) Negative /hpf    UA:UC IF INDICATED Culture not indicated by UA result Culture not indicated by UA result      Mucus Trace (A) Negative /lpf       Radiologic Studies -   No orders to display     CT Results  (Last 48 hours)    None        CXR Results  (Last 48 hours)    None            Medical Decision Making   I am the first provider for this patient. I reviewed the vital signs, available nursing notes, past medical history, past surgical history, family history and social history. Vital Signs-Reviewed the patient's vital signs.   Patient Vitals for the past 12 hrs:   Temp Pulse Resp BP SpO2   12/03/21 2029 97.8 °F (36.6 °C) 89 16 120/78 100 %       Records Reviewed:     Provider Notes (Medical Decision Making):   Patient presented 2 weeks history of intermittent episode left flank pain. Abdomen is soft nontender and without risk factor for having acute pancreatitis. Urine is clear without hematuria and without signs of pyelonephritis clinically. Pain is very likely due to muscle strain. Patient was discharged in stable condition with pain management and instruction for muscle strain. ED Course:   Initial assessment performed. The patients presenting problems have been discussed, and they are in agreement with the care plan formulated and outlined with them. I have encouraged them to ask questions as they arise throughout their visit. No apparent distress. PROCEDURES      Disposition: Condition stable   DC- Adult Discharges: All of the diagnostic tests were reviewed and questions answered. Diagnosis, care plan and treatment options were discussed. understand instructions and will follow up as directed. The patients results have been reviewed with them. They have been counseled regarding their diagnosis. The patient verbally convey understanding and agreement of the signs, symptoms, diagnosis, treatment and prognosis and additionally agrees to follow up as recommended. They also agree with the care-plan and convey that all of their questions have been answered. I have also put together some discharge instructions for them that include: 1) educational information regarding their diagnosis, 2) how to care for their diagnosis at home, as well a 3) list of reasons why they would want to return to the ED prior to their follow-up appointment, should their condition change. PLAN:  1.    Current Discharge Medication List      START taking these medications    Details   ibuprofen (MOTRIN) 800 mg tablet Take 1 Tablet by mouth every eight (8) hours as needed for Pain for up to 7 days. Qty: 20 Tablet, Refills: 0  Start date: 12/3/2021, End date: 12/10/2021      lidocaine 4 % patch As directed  Qty: 15 Patch, Refills: 2  Start date: 12/3/2021           2. Follow-up Information     Follow up With Specialties Details Why Contact Info    Follow up with your primary care physician  Schedule an appointment as soon as possible for a visit in 3 days As needed         Return to ED if worse     Diagnosis     Clinical Impression:   1. Left flank pain    2. Muscle strain        Please note that this dictation was completed with [employer], the computer voice recognition software. Quite often unanticipated grammatical, syntax, homophones, and other interpretive errors are inadvertently transcribed by the computer software. Please disregard these errors. Please excuse any errors that have escaped final proofreading. Thank you.

## 2021-12-16 ENCOUNTER — OFFICE VISIT (OUTPATIENT)
Dept: INTERNAL MEDICINE CLINIC | Age: 31
End: 2021-12-16
Payer: MEDICAID

## 2021-12-16 VITALS
RESPIRATION RATE: 12 BRPM | OXYGEN SATURATION: 100 % | BODY MASS INDEX: 30.19 KG/M2 | DIASTOLIC BLOOD PRESSURE: 80 MMHG | HEART RATE: 90 BPM | HEIGHT: 60 IN | SYSTOLIC BLOOD PRESSURE: 128 MMHG | WEIGHT: 153.8 LBS

## 2021-12-16 DIAGNOSIS — E55.9 VITAMIN D DEFICIENCY: ICD-10-CM

## 2021-12-16 DIAGNOSIS — Z00.00 ANNUAL PHYSICAL EXAM: ICD-10-CM

## 2021-12-16 DIAGNOSIS — J30.2 SEASONAL ALLERGIC RHINITIS, UNSPECIFIED TRIGGER: ICD-10-CM

## 2021-12-16 DIAGNOSIS — K21.9 GASTROESOPHAGEAL REFLUX DISEASE WITHOUT ESOPHAGITIS: ICD-10-CM

## 2021-12-16 DIAGNOSIS — Z82.49 FAMILY HISTORY OF HYPERTENSION: ICD-10-CM

## 2021-12-16 DIAGNOSIS — R10.10 PAIN OF UPPER ABDOMEN: ICD-10-CM

## 2021-12-16 DIAGNOSIS — Z83.3 FAMILY HISTORY OF DIABETES MELLITUS: ICD-10-CM

## 2021-12-16 PROCEDURE — 99395 PREV VISIT EST AGE 18-39: CPT | Performed by: INTERNAL MEDICINE

## 2021-12-16 NOTE — PROGRESS NOTES
Chief Complaint   Patient presents with    Abdominal Pain    ED Follow-up     [facility] 12/03/2021 Abd pain        Visit Vitals  /80 (BP 1 Location: Right upper arm, BP Patient Position: Sitting, BP Cuff Size: Adult)   Pulse 90   Resp 12   Ht 5' (1.524 m)   Wt 153 lb 12.8 oz (69.8 kg)   SpO2 100%   BMI 30.04 kg/m²       1. Have you been to the ER, urgent care clinic since your last visit? Hospitalized since your last visit? 12/03/2021 [facility] Abdominal Pain     2. Have you seen or consulted any other health care providers outside of the [address] [place] [address] since your last visit? Include any pap smears or colon screening.  No

## 2021-12-16 NOTE — PROGRESS NOTES
Braden Abarca is a 27 y.o. female and presents with Abdominal Pain, ED Follow-up [facility] 12/03/2021 Abd pain ), and Physical    Ms. Rebeca Navrarete came for annual physical and also wants to have follow-up after ER visit on third December for flank pain. Her urine examination was normal, she is known to have GERD. She does not smoke. She drinks only occasionally. She works with children. She told me that her pain is located below the intercostal region both side and it radiates to the back around the spine without nausea or vomiting and there is no weight loss. No relation with the meal.  No triggering factor. She told me that she takes omeprazole and sucralfate not helping. She does eat unhealthy diet and snacks and eat fried food. Her urine pregnancy test was negative and urine analysis was  showing some ketones and trace mucus and some bacteria. Leukocyte esterase was negative and WBC was negative. The pain is sharp shooting and she was told that she has muscular spasm but she thinks it is not due to muscle spasm. She does live children but she is not ready to believe that she has sprain or pulled her muscle. She was recommended to take ibuprofen but I think she is not taking because she has doubt that it is not the diagnosis so I ordered x-ray abdomen and  labs ordered. In the past she had vitamin D deficiency. She has not done any GYN checkup and Pap smear this year. Currently she is not sexually active. 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 She had vitamin D deficiency and she was treated with her vitamin D but she did not repeat the labs that I ordered so I ordered again. No depression or mental health problems. No history of substance abuse. Review of Systems    Review of Systems   Constitutional: Negative. HENT: Negative for hearing loss and sinus pain. Respiratory: Negative for cough and wheezing. Cardiovascular: Negative.     Gastrointestinal: Positive for abdominal pain. Genitourinary: Negative. Musculoskeletal: Positive for back pain. Neurological: Negative for dizziness, tingling, tremors, sensory change and headaches. Psychiatric/Behavioral: Negative for depression, hallucinations and substance abuse. The patient is not nervous/anxious and does not have insomnia. Past Medical History:   Diagnosis Date    Acid reflux      No past surgical history on file. Social History     Socioeconomic History    Marital status: SINGLE   Tobacco Use    Smoking status: Never Smoker    Smokeless tobacco: Never Used   Vaping Use    Vaping Use: Never used   Substance and Sexual Activity    Alcohol use: Yes     Comment: occasionally    Drug use: Never     Family History   Problem Relation Age of Onset    Headache Maternal Grandmother     Diabetes Maternal Grandmother     Heart Disease Maternal Grandfather     Diabetes Paternal Grandmother     Headache Paternal Grandmother     Hypertension Mother     Diabetes Father     Hypertension Father      Current Outpatient Medications   Medication Sig Dispense Refill    cetirizine (ZYRTEC) 10 mg tablet Take 1 Tablet by mouth daily. 30 Tablet 3    fluticasone propionate (FLONASE) 50 mcg/actuation nasal spray 2 Sprays by Both Nostrils route daily. 1 Bottle 3    cholecalciferol (Vitamin D3) (5000 Units/125 mcg) tab tablet Take 1 Tab by mouth daily. 30 Tab 2    omeprazole (PRILOSEC) 40 mg capsule Take 1 Cap by mouth daily. Take 30 minutes before eating once a day. Indications: heartburn 30 Cap 4    sucralfate (CARAFATE) 1 gram tablet Take 1 Tab by mouth four (4) times daily. 120 Tab 2    albuterol (PROVENTIL HFA, VENTOLIN HFA, PROAIR HFA) 90 mcg/actuation inhaler Take 1 Puff by inhalation every six (6) hours as needed for Wheezing or Shortness of Breath.  Indications: asthma attack 1 Inhaler 0     No Known Allergies    Objective:  Visit Vitals  /80 (BP 1 Location: Right upper arm, BP Patient Position: Sitting, BP Cuff Size: Adult)   Pulse 90   Resp 12   Ht 5' (1.524 m)   Wt 153 lb 12.8 oz (69.8 kg)   SpO2 100%   BMI 30.04 kg/m²       Physical Exam:   Constitutional: General Appearance: Pleasant annual preventive physical examination. She has taken Covid vaccine. Recommended to take flu vaccine once a year. Recommended to make appointment with gynecologist. Level of Distress: NAD. Psychiatric: Mental Status: normal mood and affect Orientation: to time, place, and person. ,normal eye contact. Head: Head: normocephalic and atraumatic. Eyes: Pupils: PERRLA. Sclerae: non-icteric. Neck: Neck: supple, trachea midline, and no masses. Lymph Nodes: no cervical LAD. Thyroid: no enlargement or nodules and non-tender. Lungs: Respiratory effort: no dyspnea. Auscultation: no wheezing, rales/crackles, or rhonchi and breath sounds normal and good air movement. Cardiovascular: Apical Impulse: not displaced. Heart Auscultation: normal S1 and S2; no murmurs, rubs, or gallops; and RRR. Neck vessels: no carotid bruits. Pulses including femoral / pedal: normal throughout. Abdomen: Bowel Sounds: normal. Inspection and Palpation: no tenderness, guarding, or masses and soft and non-distended. Liver: non-tender and no hepatomegaly. Spleen: non-tender and no splenomegaly. Musculoskeletal[de-identified] Extremities: no edema,no varicosities. No Calf tenderness. Neurologic: Gait and Station: normal gait and station. Motor Strength normal right and left. Skin: Inspection and palpation: no rash, lesions, or ulcer.        Results for orders placed or performed during the hospital encounter of 12/03/21   HCG URINE, QL   Result Value Ref Range    HCG urine, QL Negative Negative     URINALYSIS W/ REFLEX CULTURE    Specimen: Urine   Result Value Ref Range    Color Yellow      Appearance Clear Clear      Specific gravity 1.015 1.003 - 1.030      pH (UA) 6.5 5.0 - 8.0      Protein Negative Negative mg/dL    Glucose Negative Negative mg/dL    Ketone 15 (A) Negative mg/dL    Bilirubin Negative Negative      Blood Negative Negative      Urobilinogen 0.1 (L) 0.2 - 1.0 EU/dL    Nitrites Negative Negative      Leukocyte Esterase Negative Negative      WBC 0-4 0 - 4 /hpf    RBC 0-5 0 - 5 /hpf    Epithelial cells Few Few /lpf    Bacteria 2+ (A) Negative /hpf    UA:UC IF INDICATED Culture not indicated by UA result Culture not indicated by UA result      Mucus Trace (A) Negative /lpf       Assessment/Plan:      ICD-10-CM ICD-9-CM    1. Annual physical exam  Z00.00 V70.0 CBC WITH AUTOMATED DIFF   2. Vitamin D deficiency  E55.9 268.9 VITAMIN D, 25 HYDROXY   3. Pain of upper abdomen  R10.10 789.09 CBC WITH AUTOMATED DIFF      METABOLIC PANEL, COMPREHENSIVE      TSH 3RD GENERATION      XR ABD (KUB)   4. Gastroesophageal reflux disease without esophagitis  K21.9 530.81    5. Seasonal allergic rhinitis, unspecified trigger  J30.2 477.9    6. Family history of hypertension  Z82.49 V17.49    7. Family history of diabetes mellitus  Z83.3 V18.0      Orders Placed This Encounter    XR ABD (KUB)     Standing Status:   Future     Standing Expiration Date:   1/16/2022     Order Specific Question:   Is Patient Pregnant? Answer:   No     Order Specific Question:   Reason for Exam     Answer:   upper abdominal pain radiating to back ,taking omeprazole    CBC WITH AUTOMATED DIFF    METABOLIC PANEL, COMPREHENSIVE    TSH 3RD GENERATION    VITAMIN D, 25 HYDROXY     Annual preventive physical exam age-appropriate preventive health education screening and vaccinations advised. Strongly recommended to make appointment with gynecologist that she follows at [address] [place] [address] for [facility]. She has not done Pap smear or GYN checkup this year. No history of STDs. Currently according to her she is not sexually active. No history of HSV gonorrhea or syphilis.   She should make appointment with GYN as soon as possible and meanwhile she is complaining of some pain around both intercostal region and radiating to the back and recently she visited emergency room on third December and she was negative for UTI excepts few bacteria and mucus and ketones in the urine and urine pregnancy test was negative she was recommended to take ibuprofen but it seems like she has not taken that can see whether it is helping or not and she told me she took ibuprofen does not help and she was recommended and told that she has muscle pull and spasm but she told me she does not believe that she has muscle spasm but might be possible because 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 Follow-up in 6 weeks. She should take flu vaccine every year. She has no personal or family history of colon cancer or breast cancer. Her blood pressure is controlled. it seems like she has not taken ibuprofen enough to determine whether it is working as NSAID and anti-inflammatory or not. No history of fall or trauma. lose weight, follow low fat diet, follow low salt diet, continue present plan, routine labs ordered, call if any problems    There are no Patient Instructions on file for this visit. Follow-up and Dispositions    · Return in about 6 weeks (around 1/27/2022) for pain in abdomen.

## 2022-01-15 PROBLEM — Z00.00 ANNUAL PHYSICAL EXAM: Status: RESOLVED | Noted: 2021-12-16 | Resolved: 2022-01-15

## 2022-01-20 ENCOUNTER — TELEPHONE (OUTPATIENT)
Dept: INTERNAL MEDICINE CLINIC | Age: 32
End: 2022-01-20

## 2022-01-20 DIAGNOSIS — R10.10 PAIN OF UPPER ABDOMEN: Primary | ICD-10-CM

## 2022-01-20 NOTE — TELEPHONE ENCOUNTER
----- Message from Meño Gutierrez sent at 1/18/2022  1:08 PM EST -----  Subject: Message to Provider    QUESTIONS  Information for Provider? Patient called in stating she has a order for   x-ray of abdomen, I do not see in orders but she does have the requisition   dated 12/16. . Calling to verify. Please call patient   ---------------------------------------------------------------------------  --------------  CALL BACK INFO  What is the best way for the office to contact you? OK to leave message on   voicemail  Preferred Call Back Phone Number? [phone]  ---------------------------------------------------------------------------  --------------  SCRIPT ANSWERS  Relationship to Patient?  Self

## 2022-03-18 PROBLEM — M54.17 LUMBOSACRAL RADICULOPATHY: Status: ACTIVE | Noted: 2021-03-29

## 2022-03-18 PROBLEM — R10.10 PAIN OF UPPER ABDOMEN: Status: ACTIVE | Noted: 2021-12-16

## 2022-03-18 PROBLEM — Z82.49 FAMILY HISTORY OF HYPERTENSION: Status: ACTIVE | Noted: 2021-06-16

## 2022-03-19 PROBLEM — R14.2 BELCHING: Status: ACTIVE | Noted: 2020-11-30

## 2022-03-19 PROBLEM — K21.9 GASTROESOPHAGEAL REFLUX DISEASE WITHOUT ESOPHAGITIS: Status: ACTIVE | Noted: 2020-11-30

## 2022-03-19 PROBLEM — R12 HEARTBURN: Status: ACTIVE | Noted: 2020-09-29

## 2022-03-19 PROBLEM — Z91.89 EXCESSIVE CONSUMPTION OF COFFEE: Status: ACTIVE | Noted: 2020-09-29

## 2022-03-19 PROBLEM — R06.2 WHEEZING: Status: ACTIVE | Noted: 2020-09-29

## 2022-03-19 PROBLEM — Z83.3 FAMILY HISTORY OF DIABETES MELLITUS: Status: ACTIVE | Noted: 2021-06-16

## 2022-03-19 PROBLEM — M79.671 RIGHT FOOT PAIN: Status: ACTIVE | Noted: 2020-09-29

## 2022-03-19 PROBLEM — M21.42 FLAT FEET, BILATERAL: Status: ACTIVE | Noted: 2020-09-29

## 2022-03-19 PROBLEM — J30.2 SEASONAL ALLERGIC RHINITIS, UNSPECIFIED TRIGGER: Status: ACTIVE | Noted: 2021-06-16

## 2022-03-19 PROBLEM — M21.41 FLAT FEET, BILATERAL: Status: ACTIVE | Noted: 2020-09-29

## 2022-03-19 PROBLEM — R07.89 ATYPICAL CHEST PAIN: Status: ACTIVE | Noted: 2020-09-29

## 2022-03-20 PROBLEM — E55.9 VITAMIN D DEFICIENCY: Status: ACTIVE | Noted: 2020-09-29

## 2022-03-20 PROBLEM — M94.0 COSTOCHONDRITIS: Status: ACTIVE | Noted: 2020-09-29

## 2022-03-20 PROBLEM — M25.561 RIGHT KNEE PAIN, UNSPECIFIED CHRONICITY: Status: ACTIVE | Noted: 2021-03-16

## 2022-06-07 ENCOUNTER — TRANSCRIBE ORDER (OUTPATIENT)
Dept: SCHEDULING | Age: 32
End: 2022-06-07

## 2022-06-07 DIAGNOSIS — M79.651 RIGHT THIGH PAIN: Primary | ICD-10-CM

## 2022-06-08 ENCOUNTER — HOSPITAL ENCOUNTER (OUTPATIENT)
Dept: NON INVASIVE DIAGNOSTICS | Age: 32
Discharge: HOME | End: 2022-06-08
Payer: MEDICAID

## 2022-06-08 DIAGNOSIS — M79.651 RIGHT THIGH PAIN: ICD-10-CM

## 2022-06-08 PROCEDURE — 93971 EXTREMITY STUDY: CPT

## 2022-11-17 ENCOUNTER — TELEPHONE (OUTPATIENT)
Dept: INTERNAL MEDICINE CLINIC | Age: 32
End: 2022-11-17

## 2022-11-17 NOTE — TELEPHONE ENCOUNTER
Patient called and states she's been having headaches for a few months. Have been getting worse more recently. No available appt and Dr. Davie Rodriguez not taking her insurance. Please advise.

## 2022-11-21 ENCOUNTER — HOSPITAL ENCOUNTER (EMERGENCY)
Age: 32
Discharge: HOME | End: 2022-11-21
Attending: STUDENT IN AN ORGANIZED HEALTH CARE EDUCATION/TRAINING PROGRAM | Admitting: STUDENT IN AN ORGANIZED HEALTH CARE EDUCATION/TRAINING PROGRAM
Payer: MEDICAID

## 2022-11-21 ENCOUNTER — APPOINTMENT (OUTPATIENT)
Dept: GENERAL RADIOLOGY | Age: 32
End: 2022-11-21
Attending: STUDENT IN AN ORGANIZED HEALTH CARE EDUCATION/TRAINING PROGRAM
Payer: MEDICAID

## 2022-11-21 VITALS
HEIGHT: 59 IN | WEIGHT: 156 LBS | RESPIRATION RATE: 16 BRPM | SYSTOLIC BLOOD PRESSURE: 122 MMHG | OXYGEN SATURATION: 100 % | TEMPERATURE: 97.7 F | DIASTOLIC BLOOD PRESSURE: 73 MMHG | HEART RATE: 94 BPM | BODY MASS INDEX: 31.45 KG/M2

## 2022-11-21 DIAGNOSIS — R06.00 DYSPNEA, UNSPECIFIED TYPE: ICD-10-CM

## 2022-11-21 DIAGNOSIS — R00.2 PALPITATIONS: Primary | ICD-10-CM

## 2022-11-21 LAB
ALBUMIN SERPL-MCNC: 3.8 G/DL (ref 3.5–5)
ALBUMIN/GLOB SERPL: 1 {RATIO} (ref 1.1–2.2)
ALP SERPL-CCNC: 68 U/L (ref 45–117)
ALT SERPL-CCNC: 19 U/L (ref 12–78)
ANION GAP SERPL CALC-SCNC: 2 MMOL/L (ref 5–15)
AST SERPL W P-5'-P-CCNC: 23 U/L (ref 15–37)
BASOPHILS # BLD: 0 K/UL (ref 0–0.1)
BASOPHILS NFR BLD: 0 % (ref 0–1)
BILIRUB SERPL-MCNC: 0.3 MG/DL (ref 0.2–1)
BUN SERPL-MCNC: 16 MG/DL (ref 6–20)
BUN/CREAT SERPL: 22 (ref 12–20)
CA-I BLD-MCNC: 9.5 MG/DL (ref 8.5–10.1)
CHLORIDE SERPL-SCNC: 108 MMOL/L (ref 97–108)
CO2 SERPL-SCNC: 28 MMOL/L (ref 21–32)
CREAT SERPL-MCNC: 0.73 MG/DL (ref 0.55–1.02)
DIFFERENTIAL METHOD BLD: ABNORMAL
EOSINOPHIL # BLD: 0 K/UL (ref 0–0.4)
EOSINOPHIL NFR BLD: 0 % (ref 0–7)
ERYTHROCYTE [DISTWIDTH] IN BLOOD BY AUTOMATED COUNT: 14.1 % (ref 11.5–14.5)
GLOBULIN SER CALC-MCNC: 3.8 G/DL (ref 2–4)
GLUCOSE SERPL-MCNC: 112 MG/DL (ref 65–100)
HCT VFR BLD AUTO: 41.4 % (ref 35–47)
HGB BLD-MCNC: 12.3 G/DL (ref 11.5–16)
IMM GRANULOCYTES # BLD AUTO: 0 K/UL (ref 0–0.04)
IMM GRANULOCYTES NFR BLD AUTO: 0 % (ref 0–0.5)
LYMPHOCYTES # BLD: 1.6 K/UL (ref 0.8–3.5)
LYMPHOCYTES NFR BLD: 14 % (ref 12–49)
MCH RBC QN AUTO: 25.2 PG (ref 26–34)
MCHC RBC AUTO-ENTMCNC: 29.7 G/DL (ref 30–36.5)
MCV RBC AUTO: 84.8 FL (ref 80–99)
MONOCYTES # BLD: 0.7 K/UL (ref 0–1)
MONOCYTES NFR BLD: 7 % (ref 5–13)
NEUTS SEG # BLD: 8.9 K/UL (ref 1.8–8)
NEUTS SEG NFR BLD: 79 % (ref 32–75)
NRBC # BLD: 0 K/UL (ref 0–0.01)
NRBC BLD-RTO: 0 PER 100 WBC
PLATELET # BLD AUTO: 338 K/UL (ref 150–400)
PMV BLD AUTO: 10.9 FL (ref 8.9–12.9)
POTASSIUM SERPL-SCNC: 5.3 MMOL/L (ref 3.5–5.1)
PROT SERPL-MCNC: 7.6 G/DL (ref 6.4–8.2)
RBC # BLD AUTO: 4.88 M/UL (ref 3.8–5.2)
SODIUM SERPL-SCNC: 138 MMOL/L (ref 136–145)
TROPONIN-HIGH SENSITIVITY: 3 NG/L (ref 0–51)
WBC # BLD AUTO: 11.3 K/UL (ref 3.6–11)

## 2022-11-21 PROCEDURE — 84484 ASSAY OF TROPONIN QUANT: CPT

## 2022-11-21 PROCEDURE — 80053 COMPREHEN METABOLIC PANEL: CPT

## 2022-11-21 PROCEDURE — 36415 COLL VENOUS BLD VENIPUNCTURE: CPT

## 2022-11-21 PROCEDURE — 74011250636 HC RX REV CODE- 250/636: Performed by: STUDENT IN AN ORGANIZED HEALTH CARE EDUCATION/TRAINING PROGRAM

## 2022-11-21 PROCEDURE — 96374 THER/PROPH/DIAG INJ IV PUSH: CPT

## 2022-11-21 PROCEDURE — 71046 X-RAY EXAM CHEST 2 VIEWS: CPT

## 2022-11-21 PROCEDURE — 85025 COMPLETE CBC W/AUTO DIFF WBC: CPT

## 2022-11-21 PROCEDURE — 93005 ELECTROCARDIOGRAM TRACING: CPT

## 2022-11-21 PROCEDURE — 99285 EMERGENCY DEPT VISIT HI MDM: CPT

## 2022-11-21 RX ORDER — KETOROLAC TROMETHAMINE 30 MG/ML
15 INJECTION, SOLUTION INTRAMUSCULAR; INTRAVENOUS
Status: COMPLETED | OUTPATIENT
Start: 2022-11-21 | End: 2022-11-21

## 2022-11-21 RX ADMIN — KETOROLAC TROMETHAMINE 15 MG: 30 INJECTION, SOLUTION INTRAMUSCULAR; INTRAVENOUS at 13:12

## 2022-11-21 NOTE — ED PROVIDER NOTES
EMERGENCY DEPARTMENT HISTORY AND PHYSICAL EXAM      Date: 11/21/2022  Patient Name: Paula Martino    History of Presenting Illness     Chief Complaint   Patient presents with    Irregular Heart Beat    Shortness of Breath       History Provided By: Patient    HPI: Paula Martino, 34 y.o. female with past medical history of GERD and palpitations presents for evaluation of palpitations and dyspnea. She continues to experience palpitations intermittently for short periods of time and was previously a Holter monitor that showed only sinus arrhythmia. Today she began to experience chest pressure, left shoulder \"tingling \"and dyspnea with her palpitations. She is not experiencing any palpitations at this time and chest and left shoulder symptoms have significantly improved without intervention at home. She denies any wheezing or cough, no fevers or chills and no sick contacts. No personal or family history of cardiac disease    There are no other complaints, changes, or physical findings at this time. Past History     Past Medical History:  Past Medical History:   Diagnosis Date    Acid reflux        Past Surgical History:  No past surgical history on file. Family History:  Family History   Problem Relation Age of Onset    Headache Maternal Grandmother     Diabetes Maternal Grandmother     Heart Disease Maternal Grandfather     Diabetes Paternal Grandmother     Headache Paternal Grandmother     Hypertension Mother     Diabetes Father     Hypertension Father        Social History:  Social History     Tobacco Use    Smoking status: Never    Smokeless tobacco: Never   Vaping Use    Vaping Use: Never used   Substance Use Topics    Alcohol use: Yes     Comment: occasionally    Drug use: Never       Allergies:  No Known Allergies    PCP: Williams Byrd MD    No current facility-administered medications on file prior to encounter.      Current Outpatient Medications on File Prior to Encounter   Medication Sig Dispense Refill    cetirizine (ZYRTEC) 10 mg tablet Take 1 Tablet by mouth daily. 30 Tablet 3    fluticasone propionate (FLONASE) 50 mcg/actuation nasal spray 2 Sprays by Both Nostrils route daily. 1 Bottle 3    cholecalciferol (Vitamin D3) (5000 Units/125 mcg) tab tablet Take 1 Tab by mouth daily. 30 Tab 2    omeprazole (PRILOSEC) 40 mg capsule Take 1 Cap by mouth daily. Take 30 minutes before eating once a day. Indications: heartburn 30 Cap 4    sucralfate (CARAFATE) 1 gram tablet Take 1 Tab by mouth four (4) times daily. 120 Tab 2    albuterol (PROVENTIL HFA, VENTOLIN HFA, PROAIR HFA) 90 mcg/actuation inhaler Take 1 Puff by inhalation every six (6) hours as needed for Wheezing or Shortness of Breath. Indications: asthma attack 1 Inhaler 0       Review of Systems   Review of Systems   Constitutional:  Negative for fever. HENT:  Negative for congestion. Respiratory:  Positive for shortness of breath. Negative for cough. Cardiovascular:  Positive for chest pain and palpitations. Gastrointestinal:  Negative for abdominal pain, constipation, nausea and vomiting. Genitourinary:  Negative for dysuria. Musculoskeletal:  Negative for arthralgias and myalgias. Skin:  Negative for rash. Allergic/Immunologic: Negative for immunocompromised state. Neurological:  Negative for syncope. Psychiatric/Behavioral:  Negative for confusion. Physical Exam   Physical Exam  Constitutional:       Appearance: Normal appearance. HENT:      Head: Normocephalic and atraumatic. Nose: Nose normal.      Mouth/Throat:      Mouth: Mucous membranes are moist.   Eyes:      Conjunctiva/sclera: Conjunctivae normal.      Pupils: Pupils are equal, round, and reactive to light. Cardiovascular:      Rate and Rhythm: Normal rate and regular rhythm. Heart sounds: Normal heart sounds. Pulmonary:      Effort: Pulmonary effort is normal.      Breath sounds: Normal breath sounds.    Abdominal:      General: There is no distension. Palpations: Abdomen is soft. Tenderness: There is no abdominal tenderness. Musculoskeletal:         General: No tenderness or deformity. Normal range of motion. Cervical back: Normal range of motion and neck supple. Skin:     General: Skin is warm and dry. Neurological:      General: No focal deficit present. Mental Status: She is alert and oriented to person, place, and time. Psychiatric:         Mood and Affect: Mood normal.         Behavior: Behavior normal.       Lab and Diagnostic Study Results   Labs -     Recent Results (from the past 12 hour(s))   CBC WITH AUTOMATED DIFF    Collection Time: 11/21/22  1:10 PM   Result Value Ref Range    WBC 11.3 (H) 3.6 - 11.0 K/uL    RBC 4.88 3.80 - 5.20 M/uL    HGB 12.3 11.5 - 16.0 g/dL    HCT 41.4 35.0 - 47.0 %    MCV 84.8 80.0 - 99.0 FL    MCH 25.2 (L) 26.0 - 34.0 PG    MCHC 29.7 (L) 30.0 - 36.5 g/dL    RDW 14.1 11.5 - 14.5 %    PLATELET 915 263 - 044 K/uL    MPV 10.9 8.9 - 12.9 FL    NRBC 0.0 0.0  WBC    ABSOLUTE NRBC 0.00 0.00 - 0.01 K/uL    NEUTROPHILS 79 (H) 32 - 75 %    LYMPHOCYTES 14 12 - 49 %    MONOCYTES 7 5 - 13 %    EOSINOPHILS 0 0 - 7 %    BASOPHILS 0 0 - 1 %    IMMATURE GRANULOCYTES 0 0 - 0.5 %    ABS. NEUTROPHILS 8.9 (H) 1.8 - 8.0 K/UL    ABS. LYMPHOCYTES 1.6 0.8 - 3.5 K/UL    ABS. MONOCYTES 0.7 0.0 - 1.0 K/UL    ABS. EOSINOPHILS 0.0 0.0 - 0.4 K/UL    ABS. BASOPHILS 0.0 0.0 - 0.1 K/UL    ABS. IMM.  GRANS. 0.0 0.00 - 0.04 K/UL    DF AUTOMATED     METABOLIC PANEL, COMPREHENSIVE    Collection Time: 11/21/22  1:10 PM   Result Value Ref Range    Sodium 138 136 - 145 mmol/L    Potassium 5.3 (H) 3.5 - 5.1 mmol/L    Chloride 108 97 - 108 mmol/L    CO2 28 21 - 32 mmol/L    Anion gap 2 (L) 5 - 15 mmol/L    Glucose 112 (H) 65 - 100 mg/dL    BUN 16 6 - 20 mg/dL    Creatinine 0.73 0.55 - 1.02 mg/dL    BUN/Creatinine ratio 22 (H) 12 - 20      eGFR >60 >60 ml/min/1.73m2    Calcium 9.5 8.5 - 10.1 mg/dL    Bilirubin, total 0.3 0.2 - 1.0 mg/dL    AST (SGOT) 23 15 - 37 U/L    ALT (SGPT) 19 12 - 78 U/L    Alk. phosphatase 68 45 - 117 U/L    Protein, total 7.6 6.4 - 8.2 g/dL    Albumin 3.8 3.5 - 5.0 g/dL    Globulin 3.8 2.0 - 4.0 g/dL    A-G Ratio 1.0 (L) 1.1 - 2.2     TROPONIN-HIGH SENSITIVITY    Collection Time: 11/21/22  1:10 PM   Result Value Ref Range    Troponin-High Sensitivity 3 0 - 51 ng/L       Radiologic Studies -   @lastxrresult@  CT Results  (Last 48 hours)      None          CXR Results  (Last 48 hours)                 11/21/22 1322  XR CHEST PA LAT Final result    Impression:  No acute cardiopulmonary findings. Narrative:  EXAM: XR CHEST PA LAT       INDICATION: eval CP and left shoulder pain       COMPARISON: Chest radiograph September 26, 2020. FINDINGS: PA and lateral radiographs of the chest. The lungs are adequately   expanded. The heart size is within normal limits. There is no focal airspace   consolidation. The vascular clarity is within normal limits. There is no   evidence of pleural effusion or pneumothorax. No displaced fracture is seen. There is a chronic-convex curvature of the thoracic spine. Medical Decision Making and ED Course   Differential Diagnosis & Medical Decision Making Provider Note:   21-year-old female presents for evaluation of palpitations and dyspnea. She was not experiencing palpitations at the time of ECG, but this showed normal sinus rhythm with sinus arrhythmia. Symptoms of improved significantly without intervention. Suspect musculoskeletal etiology but will evaluate for ACS with troponin. Sending basic lab work and checking chest x-ray for evidence of pneumonia. - I am the first provider for this patient. I reviewed the vital signs, available nursing notes, past medical history, past surgical history, family history and social history.  The patients presenting problems have been discussed, and they are in agreement with the care plan formulated and outlined with them. I have encouraged them to ask questions as they arise throughout their visit. Vital Signs-Reviewed the patient's vital signs. Patient Vitals for the past 12 hrs:   Temp Pulse Resp BP SpO2   11/21/22 1129 97.7 °F (36.5 °C) 94 16 122/73 100 %       ED Course:   ED Course as of 11/21/22 1419   Mon Nov 21, 2022   1205 ECG performed at 1132 and interpreted by me shows normal sinus rhythm with sinus arrhythmia, ventricular rate is 91, normal intervals, no ST elevation [BQ]   1351 Composition of the HEART score for chest pain, angina, NSTEMI in the ED. HEART score criteria             Score  History: Highly suspicious    2    Moderately suspicious   1    Slightly or non-suspicious   0    ECG:  Significant ST depression   2    Nonspecific repolarisation disturbance 1    Normal      0    Age:  > or = 65 years    2    > 45 to < 65 years    1    < Or = to 45 years    0    Risk factors: > or = to 3 risk factors or history of CAD 2    1 or 2 risk factors    1    No risk factors known    0    Troponin: > or = to 3x normal limit   2    > 1 to < 3x normal limit   1    < or = to normal limit    0    Calculated Total : __0___        0-3: 0.9-1.7% risk of adverse cardiac event. In the HEART Score, these patients were  discharged. 4-6: 12-16.6% risk of adverse cardiac event. In the HEART Score, these patients were  admitted to the hospital.   =7: 50-65% risk of adverse cardiac event. In the HEART Score, these patients were  candidates for early invasive measures. [BQ]      ED Course User Index  [BQ] Gisela Bryant MD         Procedures   Performed by: Christian Hunter MD  Procedures      Disposition   Disposition: DC- Adult Discharges: All of the diagnostic tests were reviewed and questions answered. Diagnosis, care plan and treatment options were discussed. The patient understands the instructions and will follow up as directed. The patients results have been reviewed with them.   They have been counseled regarding their diagnosis. The patient verbally convey understanding and agreement of the signs, symptoms, diagnosis, treatment and prognosis and additionally agrees to follow up as recommended with their PCP in 24 - 48 hours. They also agree with the care-plan and convey that all of their questions have been answered. I have also put together some discharge instructions for them that include: 1) educational information regarding their diagnosis, 2) how to care for their diagnosis at home, as well a 3) list of reasons why they would want to return to the ED prior to their follow-up appointment, should their condition change. DISCHARGE PLAN:  1. Current Discharge Medication List        CONTINUE these medications which have NOT CHANGED    Details   cetirizine (ZYRTEC) 10 mg tablet Take 1 Tablet by mouth daily. Qty: 30 Tablet, Refills: 3      fluticasone propionate (FLONASE) 50 mcg/actuation nasal spray 2 Sprays by Both Nostrils route daily. Qty: 1 Bottle, Refills: 3      cholecalciferol (Vitamin D3) (5000 Units/125 mcg) tab tablet Take 1 Tab by mouth daily. Qty: 30 Tab, Refills: 2      omeprazole (PRILOSEC) 40 mg capsule Take 1 Cap by mouth daily. Take 30 minutes before eating once a day. Indications: heartburn  Qty: 30 Cap, Refills: 4      sucralfate (CARAFATE) 1 gram tablet Take 1 Tab by mouth four (4) times daily. Qty: 120 Tab, Refills: 2      albuterol (PROVENTIL HFA, VENTOLIN HFA, PROAIR HFA) 90 mcg/actuation inhaler Take 1 Puff by inhalation every six (6) hours as needed for Wheezing or Shortness of Breath. Indications: asthma attack  Qty: 1 Inhaler, Refills: 0           2.    Follow-up Information       Follow up With Specialties Details Why Derick Argueta MD Cardiovascular Disease Physician, Internal Medicine Physician, Interventional Cardiology Physician Schedule an appointment as soon as possible for a visit   [address]   [address] [address] [phone]            3. Return to ED if worse   4. Current Discharge Medication List            Diagnosis/Clinical Impression     Clinical Impression:   1. Palpitations    2. Dyspnea, unspecified type        Attestations: Alena LIMON MD, am the primary clinician of record. Please note that this dictation was completed with QRec[employer]!, the computer voice recognition software. Quite often unanticipated grammatical, syntax, homophones, and other interpretive errors are inadvertently transcribed by the computer software. Please disregard these errors. Please excuse any errors that have escaped final proofreading. Thank you.

## 2022-11-21 NOTE — Clinical Note
[facility] EMERGENCY DEPT  [address] [place] [id number]  [phone]    Work/School Note    Date: 11/21/2022    To Whom It May concern:      Fredy Ambriz was seen and treated today in the emergency room by the following provider(s):  Attending Provider: Luis Manuel Patton MD.      Fredy Ambriz is excused from work/school on 11/21/22. She is clear to return to work/school on 11/22/22.         Sincerely,          Jose Armando Marie MD

## 2022-11-21 NOTE — TELEPHONE ENCOUNTER
Pt called she has having headaches for a few weeks, heart palpation for about 2/3 months. She has been dealing with it but states its starting to get worst. Pt advised to go to ER.

## 2022-11-23 LAB
ATRIAL RATE: 91 BPM
CALCULATED P AXIS, ECG09: 33 DEGREES
CALCULATED R AXIS, ECG10: 45 DEGREES
CALCULATED T AXIS, ECG11: 36 DEGREES
DIAGNOSIS, 93000: NORMAL
P-R INTERVAL, ECG05: 120 MS
Q-T INTERVAL, ECG07: 350 MS
QRS DURATION, ECG06: 80 MS
QTC CALCULATION (BEZET), ECG08: 430 MS
VENTRICULAR RATE, ECG03: 91 BPM

## 2023-01-23 ENCOUNTER — TELEPHONE (OUTPATIENT)
Dept: INTERNAL MEDICINE CLINIC | Age: 33
End: 2023-01-23

## 2023-01-23 NOTE — TELEPHONE ENCOUNTER
The patient stated that she is getting sharp pains in her hand. She says that she is having numbness and pain in 3 of her fingers. Pt advised to be seen in at urgent care.

## 2023-05-12 ENCOUNTER — OFFICE VISIT (OUTPATIENT)
Facility: CLINIC | Age: 33
End: 2023-05-12
Payer: MEDICAID

## 2023-05-12 VITALS
SYSTOLIC BLOOD PRESSURE: 122 MMHG | OXYGEN SATURATION: 98 % | HEIGHT: 59 IN | HEART RATE: 84 BPM | WEIGHT: 158.2 LBS | TEMPERATURE: 98.2 F | RESPIRATION RATE: 18 BRPM | DIASTOLIC BLOOD PRESSURE: 72 MMHG | BODY MASS INDEX: 31.89 KG/M2

## 2023-05-12 DIAGNOSIS — Z12.4 ENCOUNTER FOR SCREENING FOR CERVICAL CANCER: ICD-10-CM

## 2023-05-12 DIAGNOSIS — Z11.59 NEED FOR HEPATITIS C SCREENING TEST: ICD-10-CM

## 2023-05-12 DIAGNOSIS — R00.2 INTERMITTENT PALPITATIONS: ICD-10-CM

## 2023-05-12 DIAGNOSIS — J45.20 MILD INTERMITTENT ASTHMA WITHOUT COMPLICATION: ICD-10-CM

## 2023-05-12 DIAGNOSIS — G56.01 CARPAL TUNNEL SYNDROME OF RIGHT WRIST: ICD-10-CM

## 2023-05-12 DIAGNOSIS — J30.2 SEASONAL ALLERGIC RHINITIS, UNSPECIFIED TRIGGER: ICD-10-CM

## 2023-05-12 DIAGNOSIS — E55.9 VITAMIN D DEFICIENCY: ICD-10-CM

## 2023-05-12 PROCEDURE — 99214 OFFICE O/P EST MOD 30 MIN: CPT | Performed by: INTERNAL MEDICINE

## 2023-05-12 RX ORDER — LORATADINE 10 MG/1
10 TABLET ORAL DAILY
Qty: 30 TABLET | Refills: 2 | Status: SHIPPED | OUTPATIENT
Start: 2023-05-12

## 2023-05-12 RX ORDER — FLUTICASONE PROPIONATE 50 MCG
2 SPRAY, SUSPENSION (ML) NASAL DAILY
Qty: 16 G | Refills: 3 | Status: SHIPPED | OUTPATIENT
Start: 2023-05-12

## 2023-05-12 SDOH — ECONOMIC STABILITY: TRANSPORTATION INSECURITY
IN THE PAST 12 MONTHS, HAS THE LACK OF TRANSPORTATION KEPT YOU FROM MEDICAL APPOINTMENTS OR FROM GETTING MEDICATIONS?: NO

## 2023-05-12 SDOH — ECONOMIC STABILITY: FOOD INSECURITY: WITHIN THE PAST 12 MONTHS, THE FOOD YOU BOUGHT JUST DIDN'T LAST AND YOU DIDN'T HAVE MONEY TO GET MORE.: NEVER TRUE

## 2023-05-12 SDOH — ECONOMIC STABILITY: INCOME INSECURITY: HOW HARD IS IT FOR YOU TO PAY FOR THE VERY BASICS LIKE FOOD, HOUSING, MEDICAL CARE, AND HEATING?: NOT HARD AT ALL

## 2023-05-12 SDOH — ECONOMIC STABILITY: HOUSING INSECURITY: IN THE LAST 12 MONTHS, HOW MANY PLACES HAVE YOU LIVED?: 1

## 2023-05-12 SDOH — ECONOMIC STABILITY: TRANSPORTATION INSECURITY
IN THE PAST 12 MONTHS, HAS LACK OF TRANSPORTATION KEPT YOU FROM MEETINGS, WORK, OR FROM GETTING THINGS NEEDED FOR DAILY LIVING?: NO

## 2023-05-12 SDOH — ECONOMIC STABILITY: HOUSING INSECURITY
IN THE LAST 12 MONTHS, WAS THERE A TIME WHEN YOU DID NOT HAVE A STEADY PLACE TO SLEEP OR SLEPT IN A SHELTER (INCLUDING NOW)?: NO

## 2023-05-12 SDOH — ECONOMIC STABILITY: FOOD INSECURITY: WITHIN THE PAST 12 MONTHS, YOU WORRIED THAT YOUR FOOD WOULD RUN OUT BEFORE YOU GOT MONEY TO BUY MORE.: NEVER TRUE

## 2023-05-12 SDOH — ECONOMIC STABILITY: INCOME INSECURITY: IN THE LAST 12 MONTHS, WAS THERE A TIME WHEN YOU WERE NOT ABLE TO PAY THE MORTGAGE OR RENT ON TIME?: NO

## 2023-05-12 ASSESSMENT — ANXIETY QUESTIONNAIRES
1. FEELING NERVOUS, ANXIOUS, OR ON EDGE: 0
GAD7 TOTAL SCORE: 0
6. BECOMING EASILY ANNOYED OR IRRITABLE: 0
4. TROUBLE RELAXING: 0
2. NOT BEING ABLE TO STOP OR CONTROL WORRYING: 0
5. BEING SO RESTLESS THAT IT IS HARD TO SIT STILL: 0
IF YOU CHECKED OFF ANY PROBLEMS ON THIS QUESTIONNAIRE, HOW DIFFICULT HAVE THESE PROBLEMS MADE IT FOR YOU TO DO YOUR WORK, TAKE CARE OF THINGS AT HOME, OR GET ALONG WITH OTHER PEOPLE: NOT DIFFICULT AT ALL
3. WORRYING TOO MUCH ABOUT DIFFERENT THINGS: 0
7. FEELING AFRAID AS IF SOMETHING AWFUL MIGHT HAPPEN: 0

## 2023-05-12 ASSESSMENT — ENCOUNTER SYMPTOMS
EYES NEGATIVE: 1
RESPIRATORY NEGATIVE: 1
GASTROINTESTINAL NEGATIVE: 1

## 2023-05-12 ASSESSMENT — PATIENT HEALTH QUESTIONNAIRE - PHQ9
SUM OF ALL RESPONSES TO PHQ QUESTIONS 1-9: 0
SUM OF ALL RESPONSES TO PHQ QUESTIONS 1-9: 0
SUM OF ALL RESPONSES TO PHQ9 QUESTIONS 1 & 2: 0
2. FEELING DOWN, DEPRESSED OR HOPELESS: 0
SUM OF ALL RESPONSES TO PHQ QUESTIONS 1-9: 0
SUM OF ALL RESPONSES TO PHQ QUESTIONS 1-9: 0
1. LITTLE INTEREST OR PLEASURE IN DOING THINGS: 0

## 2023-05-12 ASSESSMENT — LIFESTYLE VARIABLES
HOW OFTEN DO YOU HAVE A DRINK CONTAINING ALCOHOL: NEVER
HOW MANY STANDARD DRINKS CONTAINING ALCOHOL DO YOU HAVE ON A TYPICAL DAY: PATIENT DOES NOT DRINK

## 2023-05-12 NOTE — PROGRESS NOTES
Kevin Hogan (: 1990) is a 34 y.o. female, Established patient patient, here for evaluation of the following chief complaint(s):  Follow-up Chronic Condition         ASSESSMENT/PLAN:  Below is the assessment and plan developed based on review of pertinent history, physical exam, labs, studies, and medications. 1. Intermittent palpitations  -     TSH with Reflex to FT4; Future  -     Comprehensive Metabolic Panel; Future  -     CBC with Auto Differential; Future  -     AFL - Daniel Hampton MD, Cardiology, [facility]  2. Seasonal allergic rhinitis, unspecified trigger  3. Vitamin D deficiency  -     Vitamin D 25 Hydroxy; Future  4. Carpal tunnel syndrome of right wrist  5. Mild intermittent asthma without complication  6. Need for hepatitis C screening test  -     Hepatitis C Antibody; Future  7. Encounter for screening for cervical cancer  -     Imani Narayanan MD, Ob-Gyn, [facility]      Palpitation, intermittent. No history of anxiety. No history of panic attack. She says she has never experienced anxiety. She has to lift at work. So try to take EKG in the office but machine is giving errors so she has already cardiologist she will make appointment with [facility] cardiology referral given. Labs ordered. Allergies causing sometimes ringing in ears there is no wax in the ears started on loratadine and fluticasone nasal spray    No asthma flareup    Vitamin D deficiency. Labs ordered. Symptoms of carpal tunnel right hand discussed about exercise to wear the wrist brace before referring to orthopedic. Referral given for gynecologist and cardiologist.  She has no active palpitation for now no chest pain or shortness of breath for now. Educational brochures given. Answered all her questions and concerns. Recommended to do exercise and meditation. Return in about 3 months (around 2023) for physical follow up.          SUBJECTIVE/OBJECTIVE:  KWAME Au

## 2023-05-12 NOTE — PROGRESS NOTES
1. \"Have you been to the ER, urgent care clinic since your last visit? Hospitalized since your last visit? \" No    2. \"Have you seen or consulted any other health care providers outside of the [address] [place] [address] since your last visit? \" No     3. For patients aged 39-70: Has the patient had a colonoscopy / FIT/ Cologuard? NA - based on age      If the patient is female:    4. For patients aged 41-77: Has the patient had a mammogram within the past 2 years? NA - based on age or sex      11. For patients aged 21-65: Has the patient had a pap smear?  No    Chief Complaint   Patient presents with    Follow-up Chronic Condition     /77 (Site: Left Upper Arm, Position: Sitting, Cuff Size: Medium Adult)   Pulse 94   Temp 98.2 °F (36.8 °C) (Oral)   Resp 18   Ht 4' 11\" (1.499 m)   Wt 158 lb 3.2 oz (71.8 kg)   LMP 04/30/2023   SpO2 98%   BMI 31.95 kg/m²

## 2023-05-13 LAB
25(OH)D3+25(OH)D2 SERPL-MCNC: 15.8 NG/ML (ref 30–100)
ALBUMIN SERPL-MCNC: 4.6 G/DL (ref 3.8–4.8)
ALBUMIN/GLOB SERPL: 1.8 {RATIO} (ref 1.2–2.2)
ALP SERPL-CCNC: 75 IU/L (ref 44–121)
ALT SERPL-CCNC: 14 IU/L (ref 0–32)
AST SERPL-CCNC: 18 IU/L (ref 0–40)
BASOPHILS # BLD AUTO: 0 X10E3/UL (ref 0–0.2)
BASOPHILS NFR BLD AUTO: 0 %
BILIRUB SERPL-MCNC: <0.2 MG/DL (ref 0–1.2)
BUN SERPL-MCNC: 8 MG/DL (ref 6–20)
BUN/CREAT SERPL: 9 (ref 9–23)
CALCIUM SERPL-MCNC: 9.9 MG/DL (ref 8.7–10.2)
CHLORIDE SERPL-SCNC: 102 MMOL/L (ref 96–106)
CO2 SERPL-SCNC: 23 MMOL/L (ref 20–29)
CREAT SERPL-MCNC: 0.86 MG/DL (ref 0.57–1)
EGFRCR SERPLBLD CKD-EPI 2021: 91 ML/MIN/1.73
EOSINOPHIL # BLD AUTO: 0.2 X10E3/UL (ref 0–0.4)
EOSINOPHIL NFR BLD AUTO: 2 %
ERYTHROCYTE [DISTWIDTH] IN BLOOD BY AUTOMATED COUNT: 13.4 % (ref 11.7–15.4)
GLOBULIN SER CALC-MCNC: 2.5 G/DL (ref 1.5–4.5)
GLUCOSE SERPL-MCNC: 97 MG/DL (ref 70–99)
HCT VFR BLD AUTO: 38.7 % (ref 34–46.6)
HCV IGG SERPL QL IA: NON REACTIVE
HGB BLD-MCNC: 11.8 G/DL (ref 11.1–15.9)
IMM GRANULOCYTES # BLD AUTO: 0 X10E3/UL (ref 0–0.1)
IMM GRANULOCYTES NFR BLD AUTO: 0 %
LYMPHOCYTES # BLD AUTO: 1 X10E3/UL (ref 0.7–3.1)
LYMPHOCYTES NFR BLD AUTO: 12 %
MCH RBC QN AUTO: 25.5 PG (ref 26.6–33)
MCHC RBC AUTO-ENTMCNC: 30.5 G/DL (ref 31.5–35.7)
MCV RBC AUTO: 84 FL (ref 79–97)
MONOCYTES # BLD AUTO: 0.8 X10E3/UL (ref 0.1–0.9)
MONOCYTES NFR BLD AUTO: 10 %
NEUTROPHILS # BLD AUTO: 6.3 X10E3/UL (ref 1.4–7)
NEUTROPHILS NFR BLD AUTO: 76 %
PLATELET # BLD AUTO: 307 X10E3/UL (ref 150–450)
POTASSIUM SERPL-SCNC: 5 MMOL/L (ref 3.5–5.2)
PROT SERPL-MCNC: 7.1 G/DL (ref 6–8.5)
RBC # BLD AUTO: 4.62 X10E6/UL (ref 3.77–5.28)
SODIUM SERPL-SCNC: 139 MMOL/L (ref 134–144)
T4 FREE SERPL-MCNC: 0.99 NG/DL (ref 0.82–1.77)
TSH SERPL DL<=0.005 MIU/L-ACNC: 0.35 UIU/ML (ref 0.45–4.5)
WBC # BLD AUTO: 8.2 X10E3/UL (ref 3.4–10.8)

## 2023-05-22 ENCOUNTER — TELEPHONE (OUTPATIENT)
Age: 33
End: 2023-05-22

## 2023-05-22 NOTE — TELEPHONE ENCOUNTER
New patient called to cancel appointment with Dr Jimena Mcduffie that was scheduled for 06/23/23 and said she wants a female provider and also to wait until August, she is aware that Dr. Jodi Browne has a wait list. She has been added to wait list.

## 2023-06-11 PROBLEM — Z11.59 NEED FOR HEPATITIS C SCREENING TEST: Status: RESOLVED | Noted: 2023-05-12 | Resolved: 2023-06-11

## 2023-09-01 ENCOUNTER — OFFICE VISIT (OUTPATIENT)
Facility: CLINIC | Age: 33
End: 2023-09-01
Payer: MEDICAID

## 2023-09-01 VITALS
DIASTOLIC BLOOD PRESSURE: 78 MMHG | WEIGHT: 155 LBS | HEART RATE: 72 BPM | BODY MASS INDEX: 31.25 KG/M2 | TEMPERATURE: 97 F | HEIGHT: 59 IN | OXYGEN SATURATION: 100 % | SYSTOLIC BLOOD PRESSURE: 118 MMHG | RESPIRATION RATE: 18 BRPM

## 2023-09-01 DIAGNOSIS — R51.9 NONINTRACTABLE HEADACHE, UNSPECIFIED CHRONICITY PATTERN, UNSPECIFIED HEADACHE TYPE: ICD-10-CM

## 2023-09-01 DIAGNOSIS — R53.82 CHRONIC FATIGUE: ICD-10-CM

## 2023-09-01 DIAGNOSIS — K59.00 CONSTIPATION, UNSPECIFIED CONSTIPATION TYPE: ICD-10-CM

## 2023-09-01 DIAGNOSIS — F41.8 ANXIETY ABOUT HEALTH: ICD-10-CM

## 2023-09-01 DIAGNOSIS — K21.9 GASTROESOPHAGEAL REFLUX DISEASE WITHOUT ESOPHAGITIS: ICD-10-CM

## 2023-09-01 DIAGNOSIS — R10.12 LEFT UPPER QUADRANT ABDOMINAL PAIN: ICD-10-CM

## 2023-09-01 DIAGNOSIS — E55.9 VITAMIN D DEFICIENCY: ICD-10-CM

## 2023-09-01 PROBLEM — R45.89 ANXIETY ABOUT HEALTH: Status: ACTIVE | Noted: 2023-09-01

## 2023-09-01 PROCEDURE — 99214 OFFICE O/P EST MOD 30 MIN: CPT | Performed by: INTERNAL MEDICINE

## 2023-09-01 RX ORDER — ESCITALOPRAM OXALATE 5 MG/1
5 TABLET ORAL DAILY
Qty: 30 TABLET | Refills: 2 | Status: SHIPPED | OUTPATIENT
Start: 2023-09-01

## 2023-09-01 RX ORDER — TOPIRAMATE 25 MG/1
25 TABLET ORAL NIGHTLY
Qty: 30 TABLET | Refills: 2 | Status: SHIPPED | OUTPATIENT
Start: 2023-09-01

## 2023-09-01 RX ORDER — IBUPROFEN 600 MG/1
600 TABLET ORAL 2 TIMES DAILY PRN
Qty: 30 TABLET | Refills: 0 | Status: SHIPPED | OUTPATIENT
Start: 2023-09-01

## 2023-09-01 RX ORDER — OMEPRAZOLE 40 MG/1
40 CAPSULE, DELAYED RELEASE ORAL
Qty: 30 CAPSULE | Refills: 2 | Status: SHIPPED | OUTPATIENT
Start: 2023-09-01

## 2023-09-01 SDOH — ECONOMIC STABILITY: FOOD INSECURITY: WITHIN THE PAST 12 MONTHS, THE FOOD YOU BOUGHT JUST DIDN'T LAST AND YOU DIDN'T HAVE MONEY TO GET MORE.: NEVER TRUE

## 2023-09-01 SDOH — ECONOMIC STABILITY: FOOD INSECURITY: WITHIN THE PAST 12 MONTHS, YOU WORRIED THAT YOUR FOOD WOULD RUN OUT BEFORE YOU GOT MONEY TO BUY MORE.: NEVER TRUE

## 2023-09-01 SDOH — ECONOMIC STABILITY: INCOME INSECURITY: HOW HARD IS IT FOR YOU TO PAY FOR THE VERY BASICS LIKE FOOD, HOUSING, MEDICAL CARE, AND HEATING?: NOT HARD AT ALL

## 2023-09-01 ASSESSMENT — ENCOUNTER SYMPTOMS
ALLERGIC/IMMUNOLOGIC NEGATIVE: 1
RESPIRATORY NEGATIVE: 1
CONSTIPATION: 1
EYES NEGATIVE: 1

## 2023-10-09 ENCOUNTER — TELEPHONE (OUTPATIENT)
Facility: CLINIC | Age: 33
End: 2023-10-09

## 2023-10-09 NOTE — TELEPHONE ENCOUNTER
You marked the patients notes sensitive and the patient needs to have a CT done. To get Authorization can you please go into the patients charge and unmark the patients notes as sensitive? The Auth team needs patient notes to get the CT approved.

## 2023-10-11 ENCOUNTER — HOSPITAL ENCOUNTER (OUTPATIENT)
Facility: HOSPITAL | Age: 33
Discharge: HOME | End: 2023-10-14
Attending: INTERNAL MEDICINE
Payer: MEDICAID

## 2023-10-11 DIAGNOSIS — K59.00 CONSTIPATION, UNSPECIFIED CONSTIPATION TYPE: ICD-10-CM

## 2023-10-11 DIAGNOSIS — R10.12 LEFT UPPER QUADRANT ABDOMINAL PAIN: ICD-10-CM

## 2023-10-11 PROCEDURE — 74150 CT ABDOMEN W/O CONTRAST: CPT

## 2023-10-18 DIAGNOSIS — R10.12 LEFT UPPER QUADRANT ABDOMINAL PAIN: Primary | ICD-10-CM

## 2023-11-14 ENCOUNTER — OFFICE VISIT (OUTPATIENT)
Facility: CLINIC | Age: 33
End: 2023-11-14
Payer: MEDICAID

## 2023-11-14 VITALS
RESPIRATION RATE: 16 BRPM | SYSTOLIC BLOOD PRESSURE: 128 MMHG | WEIGHT: 156 LBS | HEART RATE: 88 BPM | BODY MASS INDEX: 31.45 KG/M2 | DIASTOLIC BLOOD PRESSURE: 82 MMHG | HEIGHT: 59 IN | OXYGEN SATURATION: 98 %

## 2023-11-14 DIAGNOSIS — R79.89 LOW TSH LEVEL: ICD-10-CM

## 2023-11-14 DIAGNOSIS — R00.2 PALPITATIONS: ICD-10-CM

## 2023-11-14 DIAGNOSIS — R51.9 NONINTRACTABLE HEADACHE, UNSPECIFIED CHRONICITY PATTERN, UNSPECIFIED HEADACHE TYPE: ICD-10-CM

## 2023-11-14 DIAGNOSIS — R51.9 NONINTRACTABLE HEADACHE, UNSPECIFIED CHRONICITY PATTERN, UNSPECIFIED HEADACHE TYPE: Primary | ICD-10-CM

## 2023-11-14 DIAGNOSIS — E55.9 VITAMIN D DEFICIENCY: ICD-10-CM

## 2023-11-14 PROCEDURE — 99214 OFFICE O/P EST MOD 30 MIN: CPT | Performed by: STUDENT IN AN ORGANIZED HEALTH CARE EDUCATION/TRAINING PROGRAM

## 2023-11-14 RX ORDER — BUTALBITAL, ACETAMINOPHEN AND CAFFEINE 50; 325; 40 MG/1; MG/1; MG/1
1 TABLET ORAL EVERY 6 HOURS PRN
Qty: 30 TABLET | Refills: 1 | Status: SHIPPED | OUTPATIENT
Start: 2023-11-14

## 2023-11-14 NOTE — PROGRESS NOTES
Vy Gottlieb (: 1990) is a 28 y.o. female, Established patient patient, here for evaluation of the following chief complaint(s):  Follow-up (Discuss current health and issues from last visit with Dr. Virgilio Lyle, would like 2nd opinion ) and Headache       ASSESSMENT/PLAN:  Below is the assessment and plan developed based on review of pertinent history, physical exam, labs, studies, and medications. 1. Nonintractable headache, unspecified chronicity pattern, unspecified headache type  -     CT HEAD W WO CONTRAST; Future  -     AFL - Dale Matthew MD, Neurology, [facility]  -     butalbital-acetaminophen-caffeine (FIORICET, ESGIC) -46 MG per tablet; Take 1 tablet by mouth every 6 hours as needed for Headaches, Disp-30 tablet, R-1Normal  -     CBC; Future  -     Comprehensive Metabolic Panel; Future  2. Low TSH level  -     TSH; Future  -     T4, Free; Future  3. Vitamin D deficiency  -     Vitamin D 25 Hydroxy; Future  4. Palpitations  -     TSH; Future  -     T4, Free; Future  -     CBC; Future  -     Comprehensive Metabolic Panel; Future    PCP: Dr. Virgilio Lyle     Headaches could be related to tension headaches vs migraines. However, given she c/o occasional episodes of slurred speech, which is concerning will get CT brain. Referral to neurology given. Red flag sx explained and to go to ER if she develops any of these. Blood work as above. Continue follow up with cardiology       Return in about 3 months (around 2024) for follow up. SUBJECTIVE/OBJECTIVE:  KWAME Gottlieb is a 28 yr old who presents to the clinic for a follow up. She was recently seen by her pcp in September. She has been seeing cardiology for her palpitations, she follows with Polly Bueno cardiology, NP. She had heart monitor, there were some abnormalities, but not A fib. Plans for ECHO and then follow up. She also c/o headaches going on for months, located at the back of her head, temples.  She

## 2023-11-15 LAB
25(OH)D3+25(OH)D2 SERPL-MCNC: 33.3 NG/ML (ref 30–100)
BASOPHILS # BLD AUTO: 0 X10E3/UL (ref 0–0.2)
BASOPHILS NFR BLD AUTO: 1 %
EOSINOPHIL # BLD AUTO: 0 X10E3/UL (ref 0–0.4)
EOSINOPHIL NFR BLD AUTO: 0 %
ERYTHROCYTE [DISTWIDTH] IN BLOOD BY AUTOMATED COUNT: 13.5 % (ref 11.7–15.4)
HCT VFR BLD AUTO: 36.9 % (ref 34–46.6)
HGB BLD-MCNC: 11.5 G/DL (ref 11.1–15.9)
IMM GRANULOCYTES # BLD AUTO: 0 X10E3/UL (ref 0–0.1)
IMM GRANULOCYTES NFR BLD AUTO: 0 %
LYMPHOCYTES # BLD AUTO: 1.7 X10E3/UL (ref 0.7–3.1)
LYMPHOCYTES NFR BLD AUTO: 19 %
MCH RBC QN AUTO: 25.3 PG (ref 26.6–33)
MCHC RBC AUTO-ENTMCNC: 31.2 G/DL (ref 31.5–35.7)
MCV RBC AUTO: 81 FL (ref 79–97)
MONOCYTES # BLD AUTO: 0.7 X10E3/UL (ref 0.1–0.9)
MONOCYTES NFR BLD AUTO: 8 %
NEUTROPHILS # BLD AUTO: 6.2 X10E3/UL (ref 1.4–7)
NEUTROPHILS NFR BLD AUTO: 72 %
PLATELET # BLD AUTO: 312 X10E3/UL (ref 150–450)
RBC # BLD AUTO: 4.54 X10E6/UL (ref 3.77–5.28)
WBC # BLD AUTO: 8.7 X10E3/UL (ref 3.4–10.8)

## 2023-11-16 LAB — HCV IGG SERPL QL IA: NON REACTIVE

## 2023-11-17 LAB
ALBUMIN SERPL-MCNC: 4.8 G/DL (ref 3.9–4.9)
ALBUMIN/GLOB SERPL: 1.9 {RATIO} (ref 1.2–2.2)
ALP SERPL-CCNC: 69 IU/L (ref 44–121)
ALT SERPL-CCNC: 10 IU/L (ref 0–32)
AST SERPL-CCNC: 14 IU/L (ref 0–40)
BILIRUB SERPL-MCNC: <0.2 MG/DL (ref 0–1.2)
BUN SERPL-MCNC: 8 MG/DL (ref 6–20)
BUN/CREAT SERPL: 11 (ref 9–23)
CALCIUM SERPL-MCNC: 9.9 MG/DL (ref 8.7–10.2)
CHLORIDE SERPL-SCNC: 102 MMOL/L (ref 96–106)
CO2 SERPL-SCNC: 19 MMOL/L (ref 20–29)
CREAT SERPL-MCNC: 0.76 MG/DL (ref 0.57–1)
EGFRCR SERPLBLD CKD-EPI 2021: 105 ML/MIN/1.73
GLOBULIN SER CALC-MCNC: 2.5 G/DL (ref 1.5–4.5)
GLUCOSE SERPL-MCNC: 105 MG/DL (ref 70–99)
LEFT VENTRICULAR EJECTION FRACTION, EXTERNAL: NORMAL
POTASSIUM SERPL-SCNC: 4.8 MMOL/L (ref 3.5–5.2)
PROT SERPL-MCNC: 7.3 G/DL (ref 6–8.5)
SODIUM SERPL-SCNC: 140 MMOL/L (ref 134–144)
T4 FREE SERPL-MCNC: 1.04 NG/DL (ref 0.82–1.77)
TSH SERPL DL<=0.005 MIU/L-ACNC: 0.8 UIU/ML (ref 0.45–4.5)

## 2023-11-20 ASSESSMENT — ENCOUNTER SYMPTOMS
COUGH: 0
FACIAL SWELLING: 0
CONSTIPATION: 0
SHORTNESS OF BREATH: 0
DIARRHEA: 0
ABDOMINAL PAIN: 0
SORE THROAT: 0

## 2023-11-28 ENCOUNTER — HOSPITAL ENCOUNTER (OUTPATIENT)
Facility: HOSPITAL | Age: 33
Discharge: HOME | End: 2023-12-01
Attending: STUDENT IN AN ORGANIZED HEALTH CARE EDUCATION/TRAINING PROGRAM
Payer: MEDICAID

## 2023-11-28 DIAGNOSIS — R51.9 NONINTRACTABLE HEADACHE, UNSPECIFIED CHRONICITY PATTERN, UNSPECIFIED HEADACHE TYPE: ICD-10-CM

## 2023-11-28 PROCEDURE — 6360000004 HC RX CONTRAST MEDICATION: Performed by: STUDENT IN AN ORGANIZED HEALTH CARE EDUCATION/TRAINING PROGRAM

## 2023-11-28 PROCEDURE — 70470 CT HEAD/BRAIN W/O & W/DYE: CPT

## 2023-11-28 RX ADMIN — IOPAMIDOL 100 ML: 755 INJECTION, SOLUTION INTRAVENOUS at 14:31

## 2024-01-18 ENCOUNTER — HOSPITAL ENCOUNTER (EMERGENCY)
Facility: HOSPITAL | Age: 34
Discharge: HOME | End: 2024-01-18
Attending: EMERGENCY MEDICINE
Payer: COMMERCIAL

## 2024-01-18 VITALS
TEMPERATURE: 97.9 F | BODY MASS INDEX: 32.25 KG/M2 | WEIGHT: 160 LBS | OXYGEN SATURATION: 100 % | SYSTOLIC BLOOD PRESSURE: 127 MMHG | RESPIRATION RATE: 18 BRPM | HEIGHT: 59 IN | DIASTOLIC BLOOD PRESSURE: 74 MMHG | HEART RATE: 69 BPM

## 2024-01-18 DIAGNOSIS — M79.604 RIGHT LEG PAIN: ICD-10-CM

## 2024-01-18 DIAGNOSIS — M79.602 LEFT ARM PAIN: Primary | ICD-10-CM

## 2024-01-18 PROCEDURE — 99284 EMERGENCY DEPT VISIT MOD MDM: CPT

## 2024-01-18 PROCEDURE — 6370000000 HC RX 637 (ALT 250 FOR IP): Performed by: EMERGENCY MEDICINE

## 2024-01-18 PROCEDURE — 96372 THER/PROPH/DIAG INJ SC/IM: CPT

## 2024-01-18 PROCEDURE — 6360000002 HC RX W HCPCS: Performed by: EMERGENCY MEDICINE

## 2024-01-18 RX ORDER — ACETAMINOPHEN 500 MG
1000 TABLET ORAL
Status: COMPLETED | OUTPATIENT
Start: 2024-01-18 | End: 2024-01-18

## 2024-01-18 RX ORDER — IBUPROFEN 800 MG/1
800 TABLET ORAL
Qty: 20 TABLET | Refills: 0 | Status: SHIPPED | OUTPATIENT
Start: 2024-01-18

## 2024-01-18 RX ORDER — PREDNISONE 20 MG/1
60 TABLET ORAL
Status: COMPLETED | OUTPATIENT
Start: 2024-01-18 | End: 2024-01-18

## 2024-01-18 RX ORDER — KETOROLAC TROMETHAMINE 30 MG/ML
60 INJECTION, SOLUTION INTRAMUSCULAR; INTRAVENOUS
Status: COMPLETED | OUTPATIENT
Start: 2024-01-18 | End: 2024-01-18

## 2024-01-18 RX ORDER — ACETAMINOPHEN 500 MG
1000 TABLET ORAL EVERY 8 HOURS PRN
Qty: 360 TABLET | Refills: 1 | Status: SHIPPED | OUTPATIENT
Start: 2024-01-18

## 2024-01-18 RX ADMIN — PREDNISONE 60 MG: 20 TABLET ORAL at 22:56

## 2024-01-18 RX ADMIN — KETOROLAC TROMETHAMINE 60 MG: 60 INJECTION, SOLUTION INTRAMUSCULAR at 22:56

## 2024-01-18 RX ADMIN — ACETAMINOPHEN 1000 MG: 500 TABLET ORAL at 22:56

## 2024-01-18 ASSESSMENT — PAIN SCALES - GENERAL: PAINLEVEL_OUTOF10: 8

## 2024-01-18 ASSESSMENT — PAIN - FUNCTIONAL ASSESSMENT: PAIN_FUNCTIONAL_ASSESSMENT: 0-10

## 2024-01-19 NOTE — ED PROVIDER NOTES
EMERGENCY DEPARTMENT HISTORY AND PHYSICAL EXAM    Date: 1/18/2024  Patient Name: Angely De La Torre    History of Presenting Illness     Chief Complaint   Patient presents with    Leg Pain    Arm Pain       History Provided By: Patient    HPI: Angely De La Torre, 33 y.o. female   presents to the ED with cc of right thigh and right arm pain.  Patient complains of right thigh and right arm pain since yesterday.  No obvious injury.  Pain has been constant in mild to moderate degree with movement aggravating the pain.  No neck or back pain.  Patient also complains of intermittent episode of \"twitching\" of left arm.  No headache.  No chest pain or shortness of breath.  No OTC medications.  No vertigo symptoms.      PCP: Nayeli Byran MD    No current facility-administered medications on file prior to encounter.     Current Outpatient Medications on File Prior to Encounter   Medication Sig Dispense Refill    butalbital-acetaminophen-caffeine (FIORICET, ESGIC) -40 MG per tablet Take 1 tablet by mouth every 6 hours as needed for Headaches 30 tablet 1    psyllium (METAMUCIL) 28 % packet Take 1 packet by mouth 2 times daily Take with full glass of h20 or juice 60 each 2    omeprazole (PRILOSEC) 40 MG delayed release capsule Take 1 capsule by mouth every morning (before breakfast) (Patient not taking: Reported on 11/14/2023) 30 capsule 2    ibuprofen (ADVIL;MOTRIN) 600 MG tablet Take 1 tablet by mouth 2 times daily as needed for Pain 30 tablet 0    vitamin D (ERGOCALCIFEROL) 1.25 MG (60451 UT) CAPS capsule Take 1 capsule by mouth once a week 12 capsule 1    loratadine (CLARITIN) 10 MG tablet Take 1 tablet by mouth daily 30 tablet 2    fluticasone (FLONASE) 50 MCG/ACT nasal spray 2 sprays by Nasal route daily 16 g 3    albuterol sulfate HFA (PROVENTIL;VENTOLIN;PROAIR) 108 (90 Base) MCG/ACT inhaler Inhale 1 puff into the lungs every 6 hours as needed         Past History     Past Medical History:  Past Medical History:

## 2024-01-19 NOTE — ED TRIAGE NOTES
Right leg pain that started yesterday and states she started with left arm pain tonight. Denies any other sx.

## 2024-02-14 PROBLEM — Z12.4 SCREENING FOR CERVICAL CANCER: Status: ACTIVE | Noted: 2024-02-14

## 2024-02-15 ENCOUNTER — OFFICE VISIT (OUTPATIENT)
Facility: CLINIC | Age: 34
End: 2024-02-15
Payer: COMMERCIAL

## 2024-02-15 VITALS
HEART RATE: 72 BPM | HEIGHT: 59 IN | OXYGEN SATURATION: 99 % | TEMPERATURE: 98.4 F | WEIGHT: 153 LBS | BODY MASS INDEX: 30.84 KG/M2 | DIASTOLIC BLOOD PRESSURE: 72 MMHG | SYSTOLIC BLOOD PRESSURE: 118 MMHG

## 2024-02-15 DIAGNOSIS — M54.50 LOW BACK PAIN WITHOUT SCIATICA, UNSPECIFIED BACK PAIN LATERALITY, UNSPECIFIED CHRONICITY: ICD-10-CM

## 2024-02-15 DIAGNOSIS — G89.29 TOE PAIN, CHRONIC, RIGHT: ICD-10-CM

## 2024-02-15 DIAGNOSIS — R51.9 NONINTRACTABLE HEADACHE, UNSPECIFIED CHRONICITY PATTERN, UNSPECIFIED HEADACHE TYPE: ICD-10-CM

## 2024-02-15 DIAGNOSIS — M79.674 TOE PAIN, CHRONIC, RIGHT: ICD-10-CM

## 2024-02-15 DIAGNOSIS — J45.20 MILD INTERMITTENT ASTHMA WITHOUT COMPLICATION: ICD-10-CM

## 2024-02-15 DIAGNOSIS — F41.8 ANXIETY ABOUT HEALTH: ICD-10-CM

## 2024-02-15 DIAGNOSIS — J30.2 SEASONAL ALLERGIC RHINITIS, UNSPECIFIED TRIGGER: ICD-10-CM

## 2024-02-15 DIAGNOSIS — K59.00 CONSTIPATION, UNSPECIFIED CONSTIPATION TYPE: ICD-10-CM

## 2024-02-15 DIAGNOSIS — K21.9 GASTROESOPHAGEAL REFLUX DISEASE WITHOUT ESOPHAGITIS: ICD-10-CM

## 2024-02-15 DIAGNOSIS — R20.2 PARESTHESIA: ICD-10-CM

## 2024-02-15 DIAGNOSIS — M21.41 FLAT FEET, BILATERAL: ICD-10-CM

## 2024-02-15 DIAGNOSIS — Z91.89 EXCESSIVE CONSUMPTION OF COFFEE: ICD-10-CM

## 2024-02-15 DIAGNOSIS — M21.42 FLAT FEET, BILATERAL: ICD-10-CM

## 2024-02-15 PROCEDURE — 99214 OFFICE O/P EST MOD 30 MIN: CPT | Performed by: INTERNAL MEDICINE

## 2024-02-15 RX ORDER — ERGOCALCIFEROL 1.25 MG/1
50000 CAPSULE ORAL WEEKLY
Qty: 12 CAPSULE | Refills: 0 | Status: SHIPPED | OUTPATIENT
Start: 2024-02-15

## 2024-02-15 NOTE — PROGRESS NOTES
.  Chief Complaint   Patient presents with    Follow-up Chronic Condition     Patient having \"pin and needle sensation\" in right foot and both hands.          .  \"Have you been to the ER, urgent care clinic since your last visit?  Hospitalized since your last visit?\"    YES - When: approximately 1 months ago.  Where and Why: [facility] 01/18/2024 for Arm and Leg Pain.    “Have you seen or consulted any other health care providers outside of [facility] since your last visit?”    NO        “Have you had a pap smear?”    NO. Appointment scheduled 03/12/24 with Dr. Blackmon    ./84 (Site: Left Upper Arm, Position: Sitting, Cuff Size: Medium Adult)   Pulse 67   Temp 98.4 °F (36.9 °C) (Oral)   Ht 1.499 m (4' 11\")   Wt 69.4 kg (153 lb)   SpO2 99%   BMI 30.90 kg/m²

## 2024-02-15 NOTE — PROGRESS NOTES
Angely De La Torre (: 1990) is a 33 y.o. female, Established patient patient, here for evaluation of the following chief complaint(s):  Follow-up Chronic Condition (Patient having \"pin and needle sensation\" in right foot and both hands. )         ASSESSMENT/PLAN:  Below is the assessment and plan developed based on review of pertinent history, physical exam, labs, studies, and medications.      1. Low back pain without sciatica, unspecified back pain laterality, unspecified chronicity  2. Excessive consumption of coffee  3. Paresthesia  -     [facility] - Yas Siddiqi MD, Neurology, [place]  4. Anxiety about health  5. Seasonal allergic rhinitis, unspecified trigger  6. Flat feet, bilateral  7. Gastroesophageal reflux disease without esophagitis  8. Mild intermittent asthma without complication  9. Constipation, unspecified constipation type  10. Toe pain, chronic, right  -     [facility] - Phil Warren DPM, Podiatry, [place]  11. Nonintractable headache, unspecified chronicity pattern, unspecified headache type    She says she has low back pain.  She works as a schoolteacher.  Recommended to do stretching exercises.  Educational brochures given.  No history of fall or trauma.  No localized redness swelling tenderness or rash.    She says she has tingling and numbness in feet off-and-on.  She says it is for several months.  In the past she had vitamin D deficiency.  Recommended to take vitamin D3 and I referred her to neurologist.  Clinically she has no weight loss or no gait problem.  She says now she is no headache.  She says she has anxiety about her health.  In the past she has consulted cardiologist also and also gastroenterologist.  In the past    She was drinking excessive amount of coffee and now she drinks 24 ounces of coffee daily.  Recommended still to decrease the intake of coffee gradually and explained that it can cause palpitation and tachycardia and other side effects.    She does not do any

## 2024-03-05 RX ORDER — OMEPRAZOLE 40 MG/1
40 CAPSULE, DELAYED RELEASE ORAL
Qty: 30 CAPSULE | Refills: 2 | Status: SHIPPED | OUTPATIENT
Start: 2024-03-05

## 2024-03-12 ENCOUNTER — OFFICE VISIT (OUTPATIENT)
Age: 34
End: 2024-03-12
Payer: COMMERCIAL

## 2024-03-12 VITALS
WEIGHT: 156.25 LBS | BODY MASS INDEX: 31.5 KG/M2 | SYSTOLIC BLOOD PRESSURE: 140 MMHG | HEIGHT: 59 IN | DIASTOLIC BLOOD PRESSURE: 81 MMHG

## 2024-03-12 DIAGNOSIS — Z00.00 ANNUAL VISIT FOR GENERAL ADULT MEDICAL EXAMINATION WITHOUT ABNORMAL FINDINGS: ICD-10-CM

## 2024-03-12 DIAGNOSIS — Z11.51 SCREENING FOR HUMAN PAPILLOMAVIRUS: ICD-10-CM

## 2024-03-12 DIAGNOSIS — Z01.419 PAP SMEAR, AS PART OF ROUTINE GYNECOLOGICAL EXAMINATION: Primary | ICD-10-CM

## 2024-03-12 DIAGNOSIS — Z11.3 SCREENING FOR VENEREAL DISEASE: ICD-10-CM

## 2024-03-12 PROCEDURE — 99385 PREV VISIT NEW AGE 18-39: CPT | Performed by: OBSTETRICS & GYNECOLOGY

## 2024-03-15 PROBLEM — Z12.4 SCREENING FOR CERVICAL CANCER: Status: RESOLVED | Noted: 2024-02-14 | Resolved: 2024-03-15

## 2024-06-03 RX ORDER — ERGOCALCIFEROL 1.25 MG/1
50000 CAPSULE ORAL WEEKLY
Qty: 12 CAPSULE | Refills: 0 | OUTPATIENT
Start: 2024-06-03

## 2024-06-03 RX ORDER — FLUTICASONE PROPIONATE 50 MCG
2 SPRAY, SUSPENSION (ML) NASAL DAILY
Qty: 16 G | Refills: 3 | Status: SHIPPED | OUTPATIENT
Start: 2024-06-03

## 2024-06-03 RX ORDER — GLUCOSAMINE HCL 500 MG
TABLET ORAL
Qty: 30 TABLET | Refills: 5 | Status: SHIPPED | OUTPATIENT
Start: 2024-06-03

## 2024-06-03 RX ORDER — LORATADINE 10 MG/1
10 TABLET ORAL DAILY
Qty: 30 TABLET | Refills: 5 | Status: SHIPPED | OUTPATIENT
Start: 2024-06-03

## 2024-06-03 RX ORDER — OMEPRAZOLE 40 MG/1
40 CAPSULE, DELAYED RELEASE ORAL
Qty: 30 CAPSULE | Refills: 2 | Status: SHIPPED | OUTPATIENT
Start: 2024-06-03

## 2024-08-14 ENCOUNTER — OFFICE VISIT (OUTPATIENT)
Facility: CLINIC | Age: 34
End: 2024-08-14
Payer: COMMERCIAL

## 2024-08-14 VITALS
RESPIRATION RATE: 18 BRPM | DIASTOLIC BLOOD PRESSURE: 80 MMHG | HEART RATE: 104 BPM | SYSTOLIC BLOOD PRESSURE: 118 MMHG | HEIGHT: 59 IN | OXYGEN SATURATION: 90 % | WEIGHT: 157.6 LBS | TEMPERATURE: 98 F | BODY MASS INDEX: 31.77 KG/M2

## 2024-08-14 DIAGNOSIS — D64.9 ANEMIA, UNSPECIFIED TYPE: Primary | ICD-10-CM

## 2024-08-14 DIAGNOSIS — R45.89 ANXIETY ABOUT HEALTH: ICD-10-CM

## 2024-08-14 DIAGNOSIS — J30.2 SEASONAL ALLERGIC RHINITIS, UNSPECIFIED TRIGGER: ICD-10-CM

## 2024-08-14 DIAGNOSIS — J45.20 MILD INTERMITTENT ASTHMA WITHOUT COMPLICATION: ICD-10-CM

## 2024-08-14 DIAGNOSIS — Z01.818 PREOP EXAMINATION: ICD-10-CM

## 2024-08-14 DIAGNOSIS — Z87.898 HISTORY OF HEADACHE: ICD-10-CM

## 2024-08-14 PROCEDURE — 99214 OFFICE O/P EST MOD 30 MIN: CPT | Performed by: INTERNAL MEDICINE

## 2024-08-14 RX ORDER — GLUCOSAMINE HCL 500 MG
TABLET ORAL
Qty: 30 TABLET | Refills: 5 | Status: SHIPPED | OUTPATIENT
Start: 2024-08-14

## 2024-08-14 ASSESSMENT — ENCOUNTER SYMPTOMS
GASTROINTESTINAL NEGATIVE: 1
RESPIRATORY NEGATIVE: 1
ALLERGIC/IMMUNOLOGIC NEGATIVE: 1

## 2024-08-14 ASSESSMENT — PATIENT HEALTH QUESTIONNAIRE - PHQ9
1. LITTLE INTEREST OR PLEASURE IN DOING THINGS: NOT AT ALL
SUM OF ALL RESPONSES TO PHQ9 QUESTIONS 1 & 2: 0
SUM OF ALL RESPONSES TO PHQ QUESTIONS 1-9: 0
2. FEELING DOWN, DEPRESSED OR HOPELESS: NOT AT ALL
SUM OF ALL RESPONSES TO PHQ QUESTIONS 1-9: 0

## 2024-08-14 NOTE — PROGRESS NOTES
Angely De La Torre (: 1990) is a 33 y.o. female, Established patient patient, here for evaluation of the following chief complaint(s):  Pre-op Exam         ASSESSMENT/PLAN:  Below is the assessment and plan developed based on review of pertinent history, physical exam, labs, studies, and medications.      1. Mild intermittent asthma without complication  2. Preop examination  -     CBC with Auto Differential; Future  -     Comprehensive Metabolic Panel; Future  3. Seasonal allergic rhinitis, unspecified trigger  4. Anxiety about health  5. History of headache    Preop examination and clearance she is going to have wisdom tooth extraction on the right side.  She is hemodynamically stable.  Benefits outweigh the risk to prevent the tooth related problems she has no history of rheumatic fever or rheumatic heart disease or no congenital heart disease or no valvular problems that she needs antibiotic prophylaxis however she might need antibiotic after the procedure is over depending on the dentist evaluation.  I will order the labs.  She has not done labs since long.  She has no history of headache much.  In the past she has been referred to neurologist for headache that she has not seen.  She takes ibuprofen rarely.  She does take allergy medicines and omeprazole.  In the past CT brain without contrast was normal and CT abdomen without contrast was normal.  It was ordered for having history of constipation.  She should stop drinking caffeinated beverages and she should cut back drinking caffeine containing beverages and coffee.  She should do exercise.  Eat more vegetables fruits and fibers and do meditation.  I have filled out the form and we gave to her and we have cleared her since benefits outweigh the risk for proceeding dental extraction.    Return in about 3 months (around 2024) for follow for chronic condition.         SUBJECTIVE/OBJECTIVE:  HPI    Angely De La Torre with a pleasant personality came for

## 2024-08-14 NOTE — PROGRESS NOTES
Chief Complaint   Patient presents with    Pre-op Exam     /79 (Site: Left Upper Arm, Position: Sitting)   Pulse (!) 104   Temp 98 °F (36.7 °C) (Oral)   Resp 18   Ht 1.499 m (4' 11.02\")   Wt 71.5 kg (157 lb 9.6 oz)   LMP 07/23/2024   SpO2 90%   BMI 31.81 kg/m²         \"Have you been to the ER, urgent care clinic since your last visit?  Hospitalized since your last visit?\"    NO    “Have you seen or consulted any other health care providers outside of [facility] since your last visit?”    NO     “Have you had a pap smear?”     Yes 3- in chart     No cervical cancer screening on file             Click Here for Release of Records Request

## 2024-08-16 DIAGNOSIS — D64.9 ANEMIA, UNSPECIFIED TYPE: ICD-10-CM

## 2024-08-16 LAB
ALBUMIN SERPL-MCNC: 4.4 G/DL (ref 3.9–4.9)
ALP SERPL-CCNC: 70 IU/L (ref 44–121)
ALT SERPL-CCNC: 10 IU/L (ref 0–32)
AST SERPL-CCNC: 14 IU/L (ref 0–40)
BASOPHILS # BLD AUTO: 0 X10E3/UL (ref 0–0.2)
BASOPHILS NFR BLD AUTO: 0 %
BILIRUB SERPL-MCNC: 0.2 MG/DL (ref 0–1.2)
BUN SERPL-MCNC: 10 MG/DL (ref 6–20)
BUN/CREAT SERPL: 14 (ref 9–23)
CALCIUM SERPL-MCNC: 10 MG/DL (ref 8.7–10.2)
CHLORIDE SERPL-SCNC: 102 MMOL/L (ref 96–106)
CO2 SERPL-SCNC: 24 MMOL/L (ref 20–29)
CREAT SERPL-MCNC: 0.73 MG/DL (ref 0.57–1)
EGFRCR SERPLBLD CKD-EPI 2021: 111 ML/MIN/1.73
EOSINOPHIL # BLD AUTO: 0.1 X10E3/UL (ref 0–0.4)
EOSINOPHIL NFR BLD AUTO: 1 %
ERYTHROCYTE [DISTWIDTH] IN BLOOD BY AUTOMATED COUNT: 13.5 % (ref 11.7–15.4)
GLOBULIN SER CALC-MCNC: 2.3 G/DL (ref 1.5–4.5)
GLUCOSE SERPL-MCNC: 99 MG/DL (ref 70–99)
HCT VFR BLD AUTO: 35.2 % (ref 34–46.6)
HGB BLD-MCNC: 10.9 G/DL (ref 11.1–15.9)
IMM GRANULOCYTES # BLD AUTO: 0 X10E3/UL (ref 0–0.1)
IMM GRANULOCYTES NFR BLD AUTO: 0 %
LYMPHOCYTES # BLD AUTO: 2.3 X10E3/UL (ref 0.7–3.1)
LYMPHOCYTES NFR BLD AUTO: 25 %
MCH RBC QN AUTO: 25.2 PG (ref 26.6–33)
MCHC RBC AUTO-ENTMCNC: 31 G/DL (ref 31.5–35.7)
MCV RBC AUTO: 81 FL (ref 79–97)
MONOCYTES # BLD AUTO: 0.9 X10E3/UL (ref 0.1–0.9)
MONOCYTES NFR BLD AUTO: 10 %
NEUTROPHILS # BLD AUTO: 6 X10E3/UL (ref 1.4–7)
NEUTROPHILS NFR BLD AUTO: 64 %
PLATELET # BLD AUTO: 305 X10E3/UL (ref 150–450)
POTASSIUM SERPL-SCNC: 5.2 MMOL/L (ref 3.5–5.2)
PROT SERPL-MCNC: 6.7 G/DL (ref 6–8.5)
RBC # BLD AUTO: 4.33 X10E6/UL (ref 3.77–5.28)
SODIUM SERPL-SCNC: 139 MMOL/L (ref 134–144)
WBC # BLD AUTO: 9.3 X10E3/UL (ref 3.4–10.8)

## 2024-08-16 RX ORDER — FERROUS SULFATE 325(65) MG
325 TABLET, DELAYED RELEASE (ENTERIC COATED) ORAL 2 TIMES DAILY
Qty: 90 TABLET | Refills: 1 | Status: SHIPPED | OUTPATIENT
Start: 2024-08-16

## 2024-08-19 ENCOUNTER — TELEPHONE (OUTPATIENT)
Facility: CLINIC | Age: 34
End: 2024-08-19

## 2024-08-19 NOTE — TELEPHONE ENCOUNTER
Patient advised of repeat labs needing to be done in 6 months, she communicated understanding. ASIF MCCORMICK

## 2024-11-04 ENCOUNTER — TELEPHONE (OUTPATIENT)
Facility: CLINIC | Age: 34
End: 2024-11-04

## 2024-11-04 NOTE — TELEPHONE ENCOUNTER
----- Message from Jessi DURHAM sent at 11/4/2024  1:53 PM EST -----  Regarding: ECC Appointment Request  ECC Appointment Request    Patient needs appointment for ECC Appointment Type: Existing Condition Follow Up.    Patient Requested Dates(s): Any day after this week  Patient Requested Time: Between 1:00 PM and 3:00 PM   Provider Name: Nayeli Bryan MD    Reason for Appointment Request: Established Patient - Available appointments did not meet patient need  --------------------------------------------------------------------------------------------------------------------------    Relationship to Patient: Self     Call Back Information: OK to respond with electronic message via [employer] portal (only for patients who have registered [employer] account)      Preferred Call Back Number: Phone [phone] or [phone]       Note: Patient needs an appointment for her medication follow-up appointment.

## 2025-01-15 ENCOUNTER — TELEPHONE (OUTPATIENT)
Facility: CLINIC | Age: 35
End: 2025-01-15

## 2025-01-15 NOTE — TELEPHONE ENCOUNTER
The patient works in a  center and needs to have a TB screening done ASAP. Can she be scheduled for a nurse visit to get this done?

## 2025-02-15 PROBLEM — R10.12 LEFT UPPER QUADRANT ABDOMINAL PAIN: Status: RESOLVED | Noted: 2021-12-16 | Resolved: 2025-02-15

## 2025-02-15 PROBLEM — M79.671 RIGHT FOOT PAIN: Status: RESOLVED | Noted: 2020-09-29 | Resolved: 2025-02-15

## 2025-02-15 PROBLEM — R07.89 ATYPICAL CHEST PAIN: Status: RESOLVED | Noted: 2020-09-29 | Resolved: 2025-02-15

## 2025-02-15 PROBLEM — D64.9 ANEMIA: Status: ACTIVE | Noted: 2025-02-15

## 2025-02-15 PROBLEM — R14.2 BELCHING: Status: RESOLVED | Noted: 2020-11-30 | Resolved: 2025-02-15

## 2025-02-15 PROBLEM — Z82.49 FAMILY HISTORY OF HYPERTENSION: Status: RESOLVED | Noted: 2021-06-16 | Resolved: 2025-02-15

## 2025-02-15 PROBLEM — R12 HEARTBURN: Status: RESOLVED | Noted: 2020-09-29 | Resolved: 2025-02-15

## 2025-02-15 PROBLEM — R06.2 WHEEZING: Status: RESOLVED | Noted: 2020-09-29 | Resolved: 2025-02-15

## 2025-02-15 PROBLEM — Z87.898 HISTORY OF HEADACHE: Status: RESOLVED | Noted: 2024-08-14 | Resolved: 2025-02-15

## 2025-02-15 PROBLEM — M25.561 RIGHT KNEE PAIN, UNSPECIFIED CHRONICITY: Status: RESOLVED | Noted: 2021-03-16 | Resolved: 2025-02-15

## 2025-02-15 PROBLEM — M94.0 COSTOCHONDRITIS: Status: RESOLVED | Noted: 2020-09-29 | Resolved: 2025-02-15

## 2025-02-15 PROBLEM — K59.00 CONSTIPATION: Status: RESOLVED | Noted: 2023-09-01 | Resolved: 2025-02-15

## 2025-02-18 ENCOUNTER — OFFICE VISIT (OUTPATIENT)
Facility: CLINIC | Age: 35
End: 2025-02-18
Payer: COMMERCIAL

## 2025-02-18 VITALS
RESPIRATION RATE: 19 BRPM | BODY MASS INDEX: 32.66 KG/M2 | SYSTOLIC BLOOD PRESSURE: 120 MMHG | TEMPERATURE: 98.1 F | WEIGHT: 162 LBS | DIASTOLIC BLOOD PRESSURE: 64 MMHG | HEART RATE: 80 BPM | HEIGHT: 59 IN | OXYGEN SATURATION: 99 %

## 2025-02-18 DIAGNOSIS — D50.0 IRON DEFICIENCY ANEMIA DUE TO CHRONIC BLOOD LOSS: ICD-10-CM

## 2025-02-18 DIAGNOSIS — J45.20 MILD INTERMITTENT ASTHMA WITHOUT COMPLICATION: ICD-10-CM

## 2025-02-18 DIAGNOSIS — Z00.00 ANNUAL PHYSICAL EXAM: ICD-10-CM

## 2025-02-18 DIAGNOSIS — Z11.1 TUBERCULOSIS SCREENING: ICD-10-CM

## 2025-02-18 DIAGNOSIS — D64.9 ANEMIA, UNSPECIFIED TYPE: ICD-10-CM

## 2025-02-18 DIAGNOSIS — N92.1 MENORRHAGIA WITH IRREGULAR CYCLE: ICD-10-CM

## 2025-02-18 DIAGNOSIS — J30.1 SEASONAL ALLERGIC RHINITIS DUE TO POLLEN: ICD-10-CM

## 2025-02-18 PROCEDURE — 99395 PREV VISIT EST AGE 18-39: CPT | Performed by: INTERNAL MEDICINE

## 2025-02-18 SDOH — ECONOMIC STABILITY: FOOD INSECURITY: WITHIN THE PAST 12 MONTHS, THE FOOD YOU BOUGHT JUST DIDN'T LAST AND YOU DIDN'T HAVE MONEY TO GET MORE.: NEVER TRUE

## 2025-02-18 SDOH — ECONOMIC STABILITY: FOOD INSECURITY: WITHIN THE PAST 12 MONTHS, YOU WORRIED THAT YOUR FOOD WOULD RUN OUT BEFORE YOU GOT MONEY TO BUY MORE.: NEVER TRUE

## 2025-02-18 ASSESSMENT — ENCOUNTER SYMPTOMS
RESPIRATORY NEGATIVE: 1
ALLERGIC/IMMUNOLOGIC NEGATIVE: 1
SHORTNESS OF BREATH: 0
COUGH: 0
GASTROINTESTINAL NEGATIVE: 1
ABDOMINAL PAIN: 0

## 2025-02-18 ASSESSMENT — PATIENT HEALTH QUESTIONNAIRE - PHQ9
SUM OF ALL RESPONSES TO PHQ9 QUESTIONS 1 & 2: 0
SUM OF ALL RESPONSES TO PHQ QUESTIONS 1-9: 0
1. LITTLE INTEREST OR PLEASURE IN DOING THINGS: NOT AT ALL
SUM OF ALL RESPONSES TO PHQ QUESTIONS 1-9: 0
2. FEELING DOWN, DEPRESSED OR HOPELESS: NOT AT ALL
SUM OF ALL RESPONSES TO PHQ QUESTIONS 1-9: 0
SUM OF ALL RESPONSES TO PHQ QUESTIONS 1-9: 0

## 2025-02-18 NOTE — PROGRESS NOTES
nAgely De La Torre (: 1990) is a 34 y.o. female, Established patient patient, here for evaluation of the following chief complaint(s):  Annual Exam         ASSESSMENT/PLAN:  Below is the assessment and plan developed based on review of pertinent history, physical exam, labs, studies, and medications.      1. Annual physical exam  -     Comprehensive Metabolic Panel; Future  -     CBC with Auto Differential; Future  2. Seasonal allergic rhinitis due to pollen  3. Menorrhagia with irregular cycle  -     CBC with Auto Differential; Future  -     Ferritin; Future  -     IRON AND TIBC; Future  4. Anemia, unspecified type  -     CBC with Auto Differential; Future  5. Iron deficiency anemia due to chronic blood loss  -     CBC with Auto Differential; Future  -     Ferritin; Future  -     IRON AND TIBC; Future  -     TSH reflex to T4F; Future  6. Mild intermittent asthma without complication  7. Tuberculosis screening      Annual preventive physical exam.  Age-appropriate preventive health education screening and vaccinations advised.    Discussed about age-appropriate preventive vaccines.  Discussed about GYN checkup for regular Pap smear.  She follows gynecologist Dr. BEASLEY.    She has heavy menstrual cycle and she says now it has become irregular.  So she is going to have a follow-up appointment with gynecologist.    She had anemia probably from heavy menstrual cycle.  I am not sure.  I ordered iron level and ferritin level and she is already taking iron pill.    She needs to have PPD screening for her job that she will come on Monday.  She has no depression.  Labs ordered.    Return in about 1 year (around 2026) for  ppd on monday /nurse visit.         SUBJECTIVE/OBJECTIVE:  HPI    Angely De La Torre having pleasant personality came for annual preventive physical exam.  Her blood pressure is controlled without being on medicines.  She says she does not need omeprazole for her heartburn.  She does not have back pain.

## 2025-02-18 NOTE — PROGRESS NOTES
Chief Complaint   Patient presents with    Annual Exam     /78 (Site: Right Upper Arm, Position: Sitting)   Pulse 88   Temp 98.1 °F (36.7 °C) (Oral)   Resp 19   Ht 1.499 m (4' 11.02\")   Wt 73.5 kg (162 lb)   LMP 02/09/2025   SpO2 99%   BMI 32.70 kg/m²     \"Have you been to the ER, urgent care clinic since your last visit?  Hospitalized since your last visit?\"    NO    “Have you seen or consulted any other health care providers outside our system since your last visit?”    Cardio feb [facility]      “Have you had a pap smear?”    NO    No cervical cancer screening on file

## 2025-02-24 ENCOUNTER — NURSE ONLY (OUTPATIENT)
Facility: CLINIC | Age: 35
End: 2025-02-24

## 2025-02-24 DIAGNOSIS — Z11.1 SCREENING FOR TUBERCULOSIS: Primary | ICD-10-CM

## 2025-02-25 LAB
BASOPHILS # BLD AUTO: 0 X10E3/UL (ref 0–0.2)
BASOPHILS NFR BLD AUTO: 0 %
EOSINOPHIL # BLD AUTO: 0.1 X10E3/UL (ref 0–0.4)
EOSINOPHIL NFR BLD AUTO: 1 %
ERYTHROCYTE [DISTWIDTH] IN BLOOD BY AUTOMATED COUNT: 13.1 % (ref 11.7–15.4)
FERRITIN SERPL-MCNC: 15 NG/ML (ref 15–150)
HCT VFR BLD AUTO: 34.6 % (ref 34–46.6)
HGB BLD-MCNC: 10.5 G/DL (ref 11.1–15.9)
IMM GRANULOCYTES # BLD AUTO: 0 X10E3/UL (ref 0–0.1)
IMM GRANULOCYTES NFR BLD AUTO: 0 %
IRON SATN MFR SERPL: 17 % (ref 15–55)
IRON SERPL-MCNC: 61 UG/DL (ref 27–159)
LYMPHOCYTES # BLD AUTO: 2.2 X10E3/UL (ref 0.7–3.1)
LYMPHOCYTES NFR BLD AUTO: 26 %
MCH RBC QN AUTO: 25.4 PG (ref 26.6–33)
MCHC RBC AUTO-ENTMCNC: 30.3 G/DL (ref 31.5–35.7)
MCV RBC AUTO: 84 FL (ref 79–97)
MONOCYTES # BLD AUTO: 0.7 X10E3/UL (ref 0.1–0.9)
MONOCYTES NFR BLD AUTO: 9 %
NEUTROPHILS # BLD AUTO: 5.4 X10E3/UL (ref 1.4–7)
NEUTROPHILS NFR BLD AUTO: 64 %
PLATELET # BLD AUTO: 308 X10E3/UL (ref 150–450)
RBC # BLD AUTO: 4.13 X10E6/UL (ref 3.77–5.28)
TIBC SERPL-MCNC: 354 UG/DL (ref 250–450)
TSH SERPL DL<=0.005 MIU/L-ACNC: 0.95 UIU/ML (ref 0.45–4.5)
UIBC SERPL-MCNC: 293 UG/DL (ref 131–425)
WBC # BLD AUTO: 8.4 X10E3/UL (ref 3.4–10.8)

## 2025-02-26 LAB
ALBUMIN SERPL-MCNC: 4.2 G/DL (ref 3.9–4.9)
ALP SERPL-CCNC: 71 IU/L (ref 44–121)
ALT SERPL-CCNC: 11 IU/L (ref 0–32)
AST SERPL-CCNC: 12 IU/L (ref 0–40)
BILIRUB SERPL-MCNC: <0.2 MG/DL (ref 0–1.2)
BUN SERPL-MCNC: 11 MG/DL (ref 6–20)
BUN/CREAT SERPL: 14 (ref 9–23)
CALCIUM SERPL-MCNC: 9.2 MG/DL (ref 8.7–10.2)
CHLORIDE SERPL-SCNC: 107 MMOL/L (ref 96–106)
CO2 SERPL-SCNC: 23 MMOL/L (ref 20–29)
CREAT SERPL-MCNC: 0.77 MG/DL (ref 0.57–1)
EGFRCR SERPLBLD CKD-EPI 2021: 103 ML/MIN/1.73
GLOBULIN SER CALC-MCNC: 2.4 G/DL (ref 1.5–4.5)
GLUCOSE SERPL-MCNC: 113 MG/DL (ref 70–99)
POTASSIUM SERPL-SCNC: 4.3 MMOL/L (ref 3.5–5.2)
PROT SERPL-MCNC: 6.6 G/DL (ref 6–8.5)
SODIUM SERPL-SCNC: 140 MMOL/L (ref 134–144)

## 2025-02-26 NOTE — PROGRESS NOTES
PPD Reading Note  PPD read and results entered in [employer].  Result: 0 mm induration.  Interpretation: negative  If test not read within 48-72 hours of initial placement, patient advised to repeat in other arm 1-3 weeks after this test.  Allergic reaction: no

## 2025-03-20 PROBLEM — Z00.00 ANNUAL PHYSICAL EXAM: Status: RESOLVED | Noted: 2025-02-18 | Resolved: 2025-03-20

## 2025-04-19 ENCOUNTER — APPOINTMENT (OUTPATIENT)
Facility: HOSPITAL | Age: 35
End: 2025-04-19
Payer: COMMERCIAL

## 2025-04-19 ENCOUNTER — HOSPITAL ENCOUNTER (EMERGENCY)
Facility: HOSPITAL | Age: 35
Discharge: HOME | End: 2025-04-19
Attending: EMERGENCY MEDICINE
Payer: COMMERCIAL

## 2025-04-19 VITALS
BODY MASS INDEX: 31.45 KG/M2 | SYSTOLIC BLOOD PRESSURE: 131 MMHG | RESPIRATION RATE: 16 BRPM | HEART RATE: 91 BPM | OXYGEN SATURATION: 97 % | HEIGHT: 59 IN | DIASTOLIC BLOOD PRESSURE: 80 MMHG | TEMPERATURE: 98.5 F | WEIGHT: 156 LBS

## 2025-04-19 DIAGNOSIS — G44.209 TENSION HEADACHE: Primary | ICD-10-CM

## 2025-04-19 DIAGNOSIS — D25.9 UTERINE LEIOMYOMA, UNSPECIFIED LOCATION: ICD-10-CM

## 2025-04-19 DIAGNOSIS — R10.84 GENERALIZED ABDOMINAL PAIN: ICD-10-CM

## 2025-04-19 DIAGNOSIS — K21.9 GASTROESOPHAGEAL REFLUX DISEASE WITHOUT ESOPHAGITIS: ICD-10-CM

## 2025-04-19 LAB
APPEARANCE UR: CLEAR
BILIRUB UR QL: NEGATIVE
COLOR UR: YELLOW
GLUCOSE UR STRIP.AUTO-MCNC: NEGATIVE MG/DL
HCG UR QL: NEGATIVE
HGB UR QL STRIP: NEGATIVE
KETONES UR QL STRIP.AUTO: NEGATIVE MG/DL
LEUKOCYTE ESTERASE UR QL STRIP.AUTO: NEGATIVE
NITRITE UR QL STRIP.AUTO: NEGATIVE
PH UR STRIP: 6 (ref 5–8)
PROT UR STRIP-MCNC: NEGATIVE MG/DL
SP GR UR REFRACTOMETRY: 1.01 (ref 1–1.03)
UROBILINOGEN UR QL STRIP.AUTO: 0.1 EU/DL (ref 0.2–1)

## 2025-04-19 PROCEDURE — 99284 EMERGENCY DEPT VISIT MOD MDM: CPT

## 2025-04-19 PROCEDURE — 81025 URINE PREGNANCY TEST: CPT

## 2025-04-19 PROCEDURE — 6370000000 HC RX 637 (ALT 250 FOR IP): Performed by: EMERGENCY MEDICINE

## 2025-04-19 PROCEDURE — 81003 URINALYSIS AUTO W/O SCOPE: CPT

## 2025-04-19 PROCEDURE — 74176 CT ABD & PELVIS W/O CONTRAST: CPT

## 2025-04-19 RX ORDER — ACETAMINOPHEN 500 MG
1000 TABLET ORAL EVERY 8 HOURS PRN
Qty: 360 TABLET | Refills: 1 | Status: SHIPPED | OUTPATIENT
Start: 2025-04-19

## 2025-04-19 RX ORDER — IBUPROFEN 200 MG
400 TABLET ORAL EVERY 8 HOURS PRN
Qty: 20 TABLET | Refills: 0 | Status: SHIPPED | OUTPATIENT
Start: 2025-04-19 | End: 2025-04-26

## 2025-04-19 RX ORDER — BUTALBITAL, ACETAMINOPHEN AND CAFFEINE 50; 325; 40 MG/1; MG/1; MG/1
2 TABLET ORAL
Status: COMPLETED | OUTPATIENT
Start: 2025-04-19 | End: 2025-04-19

## 2025-04-19 RX ORDER — BUTALBITAL, ACETAMINOPHEN AND CAFFEINE 300; 40; 50 MG/1; MG/1; MG/1
1 CAPSULE ORAL EVERY 6 HOURS PRN
Qty: 20 CAPSULE | Refills: 0 | Status: SHIPPED | OUTPATIENT
Start: 2025-04-19 | End: 2025-04-24

## 2025-04-19 RX ORDER — FAMOTIDINE 20 MG/1
20 TABLET, FILM COATED ORAL 2 TIMES DAILY
Qty: 60 TABLET | Refills: 0 | Status: SHIPPED | OUTPATIENT
Start: 2025-04-19 | End: 2025-05-19

## 2025-04-19 RX ORDER — IBUPROFEN 800 MG/1
800 TABLET, FILM COATED ORAL
Status: COMPLETED | OUTPATIENT
Start: 2025-04-19 | End: 2025-04-19

## 2025-04-19 RX ADMIN — BUTALBITAL, ACETAMINOPHEN, AND CAFFEINE 2 TABLET: 325; 50; 40 TABLET ORAL at 00:43

## 2025-04-19 RX ADMIN — IBUPROFEN 800 MG: 800 TABLET, FILM COATED ORAL at 00:43

## 2025-04-19 ASSESSMENT — PAIN DESCRIPTION - LOCATION: LOCATION: ABDOMEN;HEAD

## 2025-04-19 ASSESSMENT — PAIN SCALES - GENERAL: PAINLEVEL_OUTOF10: 7

## 2025-04-19 ASSESSMENT — LIFESTYLE VARIABLES
HOW MANY STANDARD DRINKS CONTAINING ALCOHOL DO YOU HAVE ON A TYPICAL DAY: PATIENT DOES NOT DRINK
HOW OFTEN DO YOU HAVE A DRINK CONTAINING ALCOHOL: NEVER

## 2025-04-19 ASSESSMENT — PAIN - FUNCTIONAL ASSESSMENT: PAIN_FUNCTIONAL_ASSESSMENT: 0-10

## 2025-04-19 NOTE — ED TRIAGE NOTES
Patient states that she has had a headache and LUQ abdominal pain for a few days that has gotten worse today.

## 2025-04-19 NOTE — DISCHARGE INSTRUCTIONS
Thank you for choosing our Emergency Department for your care.  It is our privilege to care for you in your time of need.  In the next several days, you may receive a survey via email or mailed to your home about your experience with our team.  We would greatly appreciate you taking a few minutes to complete the survey, as we use this information to learn what we have done well and what we could be doing better. Thank you for trusting us with your care!    Below you will find a list of your tests from today's visit.   Labs and Radiology Studies  Recent Results (from the past 12 hours)   Urinalysis    Collection Time: 04/19/25 12:25 AM   Result Value Ref Range    Color, UA Yellow      Appearance Clear Clear      Specific Gravity, UA 1.010 1.003 - 1.030      pH, Urine 6.0 5.0 - 8.0      Protein, UA Negative Negative mg/dL    Glucose, Ur Negative Negative mg/dL    Ketones, Urine Negative Negative mg/dL    Bilirubin, Urine Negative Negative      Blood, Urine Negative Negative      Urobilinogen, Urine 0.1 (L) 0.2 - 1.0 EU/dL    Nitrite, Urine Negative Negative      Leukocyte Esterase, Urine Negative Negative     POC Pregnancy Urine Qual    Collection Time: 04/19/25 12:31 AM   Result Value Ref Range    Preg Test, Ur Negative Negative       CT ABDOMEN PELVIS WO CONTRAST Additional Contrast? None  Result Date: 4/19/2025  EXAM: CT ABDOMEN PELVIS WO CONTRAST ACC#: [id number]  INDICATION: left flank pain  COMPARISON: 10/11/2023 IV CONTRAST: None. ORAL CONTRAST: None. TECHNIQUE: Thin axial images were obtained through the abdomen and pelvis. Coronal and sagittal reformats were generated. CT dose reduction was achieved through use of a standardized protocol tailored for this examination and automatic exposure control for dose modulation. FINDINGS: The visualized lung bases are clear. No obvious masses are visualized in the liver.   No obvious masses in the spleen.   The pancreas is grossly unremarkable.  No large calcified

## 2025-04-19 NOTE — ED PROVIDER NOTES
[facility] EMERGENCY DEPT  EMERGENCY DEPARTMENT HISTORY AND PHYSICAL EXAM      Date: 4/19/2025  Patient Name: Angely De La Torre  MRN: [id number]  Birthdate 1990  Date of evaluation: 4/19/2025  Provider: Chema Kimball MD  Note Started: 1:18 AM EDT 4/19/25    HISTORY OF PRESENT ILLNESS     Chief Complaint   Patient presents with    Headache    Abdominal Pain       History Provided By: Patient    HPI: Angely De La Torre is a 34 y.o. female.  Patient presents with a complaint of frontal headache for last several days.  Pain has been constant and mild to moderate degree without obvious aggravating or alleviating factors.  Headache is not associated with nausea/photophobia/visual changes/paresthesia/motor deficit/vertigo.  No head injury.  Patient also complains of intermittent episode of left upper quadrant/left flank discomfort.  No obvious precipitating/aggravating relieving factors.  Intermittent episode of burning sensation radiating to lower sternum.  No dysuria or hematuria.  No fever or chills.  Patient has an upcoming appointment to see her gynecologist for irregular menstruation.        PAST MEDICAL HISTORY   Past Medical History:  Past Medical History:   Diagnosis Date    Acid reflux     Anemia 2/15/2025    GERD (gastroesophageal reflux disease)     Mild intermittent asthma without complication 05/12/2023    Nonintractable headache 9/1/2023       Past Surgical History:  History reviewed. No pertinent surgical history.    Family History:  Family History   Problem Relation Age of Onset    Diabetes Maternal Grandmother     Headache Maternal Grandmother     Hypertension Maternal Grandmother     Headache Paternal Grandmother     Diabetes Paternal Grandmother     Heart Disease Maternal Grandfather     Diabetes Maternal Grandfather     Hypertension Father     Diabetes Father     Hypertension Mother        Social History:  Social History     Tobacco Use    Smoking status: Never    Smokeless tobacco: Never   Vaping Use